# Patient Record
Sex: FEMALE | Race: WHITE | NOT HISPANIC OR LATINO
[De-identification: names, ages, dates, MRNs, and addresses within clinical notes are randomized per-mention and may not be internally consistent; named-entity substitution may affect disease eponyms.]

---

## 2017-01-03 ENCOUNTER — APPOINTMENT (OUTPATIENT)
Dept: DERMATOLOGY | Facility: CLINIC | Age: 47
End: 2017-01-03

## 2017-01-09 ENCOUNTER — APPOINTMENT (OUTPATIENT)
Dept: DERMATOLOGY | Facility: CLINIC | Age: 47
End: 2017-01-09

## 2017-01-22 ENCOUNTER — EMERGENCY (EMERGENCY)
Facility: HOSPITAL | Age: 47
LOS: 1 days | Discharge: HOME | End: 2017-01-22
Attending: EMERGENCY MEDICINE | Admitting: EMERGENCY MEDICINE
Payer: MEDICAID

## 2017-01-22 ENCOUNTER — TRANSCRIPTION ENCOUNTER (OUTPATIENT)
Age: 47
End: 2017-01-22

## 2017-01-22 VITALS
DIASTOLIC BLOOD PRESSURE: 80 MMHG | HEART RATE: 127 BPM | TEMPERATURE: 98 F | HEIGHT: 62 IN | SYSTOLIC BLOOD PRESSURE: 132 MMHG | OXYGEN SATURATION: 99 % | WEIGHT: 160.06 LBS | RESPIRATION RATE: 18 BRPM

## 2017-01-22 VITALS
HEART RATE: 91 BPM | RESPIRATION RATE: 16 BRPM | DIASTOLIC BLOOD PRESSURE: 75 MMHG | TEMPERATURE: 97 F | SYSTOLIC BLOOD PRESSURE: 122 MMHG | OXYGEN SATURATION: 97 %

## 2017-01-22 DIAGNOSIS — R19.7 DIARRHEA, UNSPECIFIED: ICD-10-CM

## 2017-01-22 LAB
ALBUMIN SERPL ELPH-MCNC: 4.2 G/DL — SIGNIFICANT CHANGE UP (ref 3.3–5)
ALP SERPL-CCNC: 81 U/L — SIGNIFICANT CHANGE UP (ref 40–120)
ALT FLD-CCNC: 54 U/L RC — HIGH (ref 10–45)
ANION GAP SERPL CALC-SCNC: 20 MMOL/L — HIGH (ref 5–17)
APPEARANCE UR: ABNORMAL
AST SERPL-CCNC: 44 U/L — HIGH (ref 10–40)
BACTERIA # UR AUTO: ABNORMAL /HPF
BASE EXCESS BLDV CALC-SCNC: -1.8 MMOL/L — SIGNIFICANT CHANGE UP (ref -2–2)
BASOPHILS # BLD AUTO: 0 K/UL — SIGNIFICANT CHANGE UP (ref 0–0.2)
BASOPHILS NFR BLD AUTO: 0.3 % — SIGNIFICANT CHANGE UP (ref 0–2)
BILIRUB SERPL-MCNC: 1.2 MG/DL — SIGNIFICANT CHANGE UP (ref 0.2–1.2)
BILIRUB UR-MCNC: NEGATIVE — SIGNIFICANT CHANGE UP
BUN SERPL-MCNC: 13 MG/DL — SIGNIFICANT CHANGE UP (ref 7–23)
CA-I SERPL-SCNC: 1.11 MMOL/L — LOW (ref 1.12–1.3)
CALCIUM SERPL-MCNC: 9.3 MG/DL — SIGNIFICANT CHANGE UP (ref 8.4–10.5)
CHLORIDE BLDV-SCNC: 109 MMOL/L — HIGH (ref 96–108)
CHLORIDE SERPL-SCNC: 99 MMOL/L — SIGNIFICANT CHANGE UP (ref 96–108)
CO2 BLDV-SCNC: 22 MMOL/L — SIGNIFICANT CHANGE UP (ref 22–30)
CO2 SERPL-SCNC: 18 MMOL/L — LOW (ref 22–31)
COLOR SPEC: YELLOW — SIGNIFICANT CHANGE UP
CREAT SERPL-MCNC: 0.71 MG/DL — SIGNIFICANT CHANGE UP (ref 0.5–1.3)
DIFF PNL FLD: ABNORMAL
EOSINOPHIL # BLD AUTO: 0 K/UL — SIGNIFICANT CHANGE UP (ref 0–0.5)
EOSINOPHIL NFR BLD AUTO: 0.5 % — SIGNIFICANT CHANGE UP (ref 0–6)
EPI CELLS # UR: SIGNIFICANT CHANGE UP /HPF
GAS PNL BLDV: 137 MMOL/L — SIGNIFICANT CHANGE UP (ref 136–145)
GAS PNL BLDV: SIGNIFICANT CHANGE UP
GLUCOSE BLDV-MCNC: 127 MG/DL — HIGH (ref 70–99)
GLUCOSE SERPL-MCNC: 126 MG/DL — HIGH (ref 70–99)
GLUCOSE UR QL: NEGATIVE — SIGNIFICANT CHANGE UP
HCO3 BLDV-SCNC: 21 MMOL/L — SIGNIFICANT CHANGE UP (ref 21–29)
HCT VFR BLD CALC: 45.9 % — HIGH (ref 34.5–45)
HCT VFR BLDA CALC: 50 % — SIGNIFICANT CHANGE UP (ref 39–50)
HGB BLD CALC-MCNC: 16.3 G/DL — HIGH (ref 11.5–15.5)
HGB BLD-MCNC: 16.4 G/DL — HIGH (ref 11.5–15.5)
KETONES UR-MCNC: ABNORMAL
LACTATE BLDV-MCNC: 2 MMOL/L — SIGNIFICANT CHANGE UP (ref 0.7–2)
LEUKOCYTE ESTERASE UR-ACNC: ABNORMAL
LIDOCAIN IGE QN: 29 U/L — SIGNIFICANT CHANGE UP (ref 7–60)
LYMPHOCYTES # BLD AUTO: 1.1 K/UL — SIGNIFICANT CHANGE UP (ref 1–3.3)
LYMPHOCYTES # BLD AUTO: 13.6 % — SIGNIFICANT CHANGE UP (ref 13–44)
MCHC RBC-ENTMCNC: 29.8 PG — SIGNIFICANT CHANGE UP (ref 27–34)
MCHC RBC-ENTMCNC: 35.7 GM/DL — SIGNIFICANT CHANGE UP (ref 32–36)
MCV RBC AUTO: 83.6 FL — SIGNIFICANT CHANGE UP (ref 80–100)
MONOCYTES # BLD AUTO: 0.6 K/UL — SIGNIFICANT CHANGE UP (ref 0–0.9)
MONOCYTES NFR BLD AUTO: 8.1 % — SIGNIFICANT CHANGE UP (ref 2–14)
NEUTROPHILS # BLD AUTO: 6.1 K/UL — SIGNIFICANT CHANGE UP (ref 1.8–7.4)
NEUTROPHILS NFR BLD AUTO: 77.5 % — HIGH (ref 43–77)
NITRITE UR-MCNC: NEGATIVE — SIGNIFICANT CHANGE UP
PCO2 BLDV: 33 MMHG — LOW (ref 35–50)
PH BLDV: 7.42 — SIGNIFICANT CHANGE UP (ref 7.35–7.45)
PH UR: 6 — SIGNIFICANT CHANGE UP (ref 4.8–8)
PLATELET # BLD AUTO: 198 K/UL — SIGNIFICANT CHANGE UP (ref 150–400)
PO2 BLDV: 27 MMHG — SIGNIFICANT CHANGE UP (ref 25–45)
POTASSIUM BLDV-SCNC: 3.3 MMOL/L — LOW (ref 3.5–5)
POTASSIUM SERPL-MCNC: 4 MMOL/L — SIGNIFICANT CHANGE UP (ref 3.5–5.3)
POTASSIUM SERPL-SCNC: 4 MMOL/L — SIGNIFICANT CHANGE UP (ref 3.5–5.3)
PROT SERPL-MCNC: 7.6 G/DL — SIGNIFICANT CHANGE UP (ref 6–8.3)
PROT UR-MCNC: 30 MG/DL
RBC # BLD: 5.49 M/UL — HIGH (ref 3.8–5.2)
RBC # FLD: 11.6 % — SIGNIFICANT CHANGE UP (ref 10.3–14.5)
RBC CASTS # UR COMP ASSIST: ABNORMAL /HPF (ref 0–2)
SAO2 % BLDV: 47 % — LOW (ref 67–88)
SODIUM SERPL-SCNC: 137 MMOL/L — SIGNIFICANT CHANGE UP (ref 135–145)
SP GR SPEC: 1.02 — SIGNIFICANT CHANGE UP (ref 1.01–1.02)
UROBILINOGEN FLD QL: NEGATIVE — SIGNIFICANT CHANGE UP
WBC # BLD: 7.9 K/UL — SIGNIFICANT CHANGE UP (ref 3.8–10.5)
WBC # FLD AUTO: 7.9 K/UL — SIGNIFICANT CHANGE UP (ref 3.8–10.5)
WBC UR QL: SIGNIFICANT CHANGE UP /HPF (ref 0–5)

## 2017-01-22 PROCEDURE — 83690 ASSAY OF LIPASE: CPT

## 2017-01-22 PROCEDURE — 82435 ASSAY OF BLOOD CHLORIDE: CPT

## 2017-01-22 PROCEDURE — 81001 URINALYSIS AUTO W/SCOPE: CPT

## 2017-01-22 PROCEDURE — 85027 COMPLETE CBC AUTOMATED: CPT

## 2017-01-22 PROCEDURE — 76705 ECHO EXAM OF ABDOMEN: CPT

## 2017-01-22 PROCEDURE — 80053 COMPREHEN METABOLIC PANEL: CPT

## 2017-01-22 PROCEDURE — 83605 ASSAY OF LACTIC ACID: CPT

## 2017-01-22 PROCEDURE — 82803 BLOOD GASES ANY COMBINATION: CPT

## 2017-01-22 PROCEDURE — 82947 ASSAY GLUCOSE BLOOD QUANT: CPT

## 2017-01-22 PROCEDURE — 99284 EMERGENCY DEPT VISIT MOD MDM: CPT | Mod: 25

## 2017-01-22 PROCEDURE — 96374 THER/PROPH/DIAG INJ IV PUSH: CPT

## 2017-01-22 PROCEDURE — 85014 HEMATOCRIT: CPT

## 2017-01-22 PROCEDURE — 84132 ASSAY OF SERUM POTASSIUM: CPT

## 2017-01-22 PROCEDURE — 82330 ASSAY OF CALCIUM: CPT

## 2017-01-22 PROCEDURE — 93005 ELECTROCARDIOGRAM TRACING: CPT

## 2017-01-22 PROCEDURE — 96375 TX/PRO/DX INJ NEW DRUG ADDON: CPT

## 2017-01-22 PROCEDURE — 93010 ELECTROCARDIOGRAM REPORT: CPT

## 2017-01-22 PROCEDURE — 84295 ASSAY OF SERUM SODIUM: CPT

## 2017-01-22 PROCEDURE — 99285 EMERGENCY DEPT VISIT HI MDM: CPT | Mod: 25

## 2017-01-22 RX ORDER — SODIUM CHLORIDE 9 MG/ML
1000 INJECTION INTRAMUSCULAR; INTRAVENOUS; SUBCUTANEOUS ONCE
Qty: 0 | Refills: 0 | Status: COMPLETED | OUTPATIENT
Start: 2017-01-22 | End: 2017-01-22

## 2017-01-22 RX ORDER — ONDANSETRON 8 MG/1
1 TABLET, FILM COATED ORAL
Qty: 9 | Refills: 0 | OUTPATIENT
Start: 2017-01-22 | End: 2017-01-25

## 2017-01-22 RX ORDER — GABAPENTIN 400 MG/1
1 CAPSULE ORAL
Qty: 0 | Refills: 0 | COMMUNITY

## 2017-01-22 RX ORDER — ONDANSETRON 8 MG/1
4 TABLET, FILM COATED ORAL ONCE
Qty: 0 | Refills: 0 | Status: COMPLETED | OUTPATIENT
Start: 2017-01-22 | End: 2017-01-22

## 2017-01-22 RX ORDER — KETOROLAC TROMETHAMINE 30 MG/ML
15 SYRINGE (ML) INJECTION ONCE
Qty: 0 | Refills: 0 | Status: DISCONTINUED | OUTPATIENT
Start: 2017-01-22 | End: 2017-01-22

## 2017-01-22 RX ADMIN — Medication 15 MILLIGRAM(S): at 16:41

## 2017-01-22 RX ADMIN — ONDANSETRON 4 MILLIGRAM(S): 8 TABLET, FILM COATED ORAL at 16:41

## 2017-01-22 RX ADMIN — SODIUM CHLORIDE 4000 MILLILITER(S): 9 INJECTION INTRAMUSCULAR; INTRAVENOUS; SUBCUTANEOUS at 16:42

## 2017-01-22 RX ADMIN — SODIUM CHLORIDE 4000 MILLILITER(S): 9 INJECTION INTRAMUSCULAR; INTRAVENOUS; SUBCUTANEOUS at 16:41

## 2017-01-22 NOTE — ED ADULT NURSE NOTE - OBJECTIVE STATEMENT
47 y/o F pt, present to ED with nausea, vomiting, diarrhea and abd pain since yesterday, started with nausea and vomiting, abd pain is epigastric that's radiates RUQ, chills on and off, no fevers, pt went to urgent care who sent her to the ED, pt unable to tolerate PO intake due to N/V, on exam pt abd soft, ND, tenderness to palpate to RUQ, nauseous, denies chest pain, SOB, dizziness, dysuria, hematuria, melena

## 2017-01-22 NOTE — ED PROVIDER NOTE - MEDICAL DECISION MAKING DETAILS
Resident: DENISE,. chloe epigastric tenderness will check ultrasound RUQ and lipase. will po challenge aftert zofran

## 2017-01-22 NOTE — ED PROVIDER NOTE - OBJECTIVE STATEMENT
45 y/o F with 1 day history of abd pain, nausea/vomiting/diarrhea.  6 x vomiting, multiple diarrhea, nonbloody non bilious.  abd pain is generalized moderate crampy and nonradiating. No fever + chills, no urinary complaints. 45 y/o F with 1 day history of abd pain, nausea/vomiting/diarrhea.  6 x vomiting, multiple diarrhea, nonbloody non bilious.  abd pain is generalized moderate crampy and non radiating. No fever + chills, no urinary complaints.

## 2017-01-22 NOTE — ED PROCEDURE NOTE - PROCEDURE ADDITIONAL DETAILS
Emergency Department Focused Ultrasound performed at patient's bedside.  The complete report can be found in PACS.

## 2017-01-22 NOTE — ED PROVIDER NOTE - PROGRESS NOTE DETAILS
Patient pain improved. Still having diarrhea. no vomiting. tolerated po. ruq u/s wnl. will reassess feels better. states no s/s uti - will not treat, told to f/u pmd samantha if urinary symptoms. -sukumar

## 2017-01-23 PROCEDURE — 76705 ECHO EXAM OF ABDOMEN: CPT | Mod: 26

## 2017-01-24 ENCOUNTER — APPOINTMENT (OUTPATIENT)
Dept: DERMATOLOGY | Facility: CLINIC | Age: 47
End: 2017-01-24

## 2017-01-30 PROBLEM — M79.7 FIBROMYALGIA: Chronic | Status: ACTIVE | Noted: 2017-01-22

## 2017-02-06 ENCOUNTER — APPOINTMENT (OUTPATIENT)
Dept: INTERNAL MEDICINE | Facility: CLINIC | Age: 47
End: 2017-02-06

## 2017-02-06 VITALS
SYSTOLIC BLOOD PRESSURE: 120 MMHG | DIASTOLIC BLOOD PRESSURE: 80 MMHG | TEMPERATURE: 98.4 F | WEIGHT: 160 LBS | HEIGHT: 62 IN | BODY MASS INDEX: 29.44 KG/M2

## 2017-02-16 ENCOUNTER — FORM ENCOUNTER (OUTPATIENT)
Age: 47
End: 2017-02-16

## 2017-02-17 ENCOUNTER — APPOINTMENT (OUTPATIENT)
Dept: ULTRASOUND IMAGING | Facility: CLINIC | Age: 47
End: 2017-02-17

## 2017-02-17 ENCOUNTER — OUTPATIENT (OUTPATIENT)
Dept: OUTPATIENT SERVICES | Facility: HOSPITAL | Age: 47
LOS: 1 days | End: 2017-02-17
Payer: MEDICAID

## 2017-02-17 DIAGNOSIS — R79.89 OTHER SPECIFIED ABNORMAL FINDINGS OF BLOOD CHEMISTRY: ICD-10-CM

## 2017-04-13 PROCEDURE — 76700 US EXAM ABDOM COMPLETE: CPT

## 2017-05-10 ENCOUNTER — APPOINTMENT (OUTPATIENT)
Dept: DERMATOLOGY | Facility: CLINIC | Age: 47
End: 2017-05-10

## 2017-05-10 VITALS — DIASTOLIC BLOOD PRESSURE: 72 MMHG | SYSTOLIC BLOOD PRESSURE: 122 MMHG

## 2017-05-10 VITALS — SYSTOLIC BLOOD PRESSURE: 122 MMHG | DIASTOLIC BLOOD PRESSURE: 72 MMHG

## 2017-06-07 ENCOUNTER — EMERGENCY (EMERGENCY)
Facility: HOSPITAL | Age: 47
LOS: 1 days | Discharge: HOME | End: 2017-06-07
Attending: EMERGENCY MEDICINE | Admitting: EMERGENCY MEDICINE
Payer: COMMERCIAL

## 2017-06-07 VITALS
DIASTOLIC BLOOD PRESSURE: 68 MMHG | HEART RATE: 83 BPM | OXYGEN SATURATION: 100 % | RESPIRATION RATE: 18 BRPM | TEMPERATURE: 97 F | SYSTOLIC BLOOD PRESSURE: 137 MMHG

## 2017-06-07 DIAGNOSIS — Z90.710 ACQUIRED ABSENCE OF BOTH CERVIX AND UTERUS: Chronic | ICD-10-CM

## 2017-06-07 PROCEDURE — 73080 X-RAY EXAM OF ELBOW: CPT | Mod: 26,LT

## 2017-06-07 PROCEDURE — 72125 CT NECK SPINE W/O DYE: CPT | Mod: 26

## 2017-06-07 PROCEDURE — 73502 X-RAY EXAM HIP UNI 2-3 VIEWS: CPT | Mod: 26,LT

## 2017-06-07 PROCEDURE — 99284 EMERGENCY DEPT VISIT MOD MDM: CPT

## 2017-06-07 RX ORDER — IBUPROFEN 200 MG
600 TABLET ORAL ONCE
Qty: 0 | Refills: 0 | Status: COMPLETED | OUTPATIENT
Start: 2017-06-07 | End: 2017-06-07

## 2017-06-07 RX ADMIN — Medication 600 MILLIGRAM(S): at 18:29

## 2017-06-07 NOTE — ED PROVIDER NOTE - PROGRESS NOTE DETAILS
EVA POLLOCK: Received signout and discussed plan with QUID attending. CT and Xrays negative, pain well controlled. Stable for d/c home with followup. Pt amenable with plan.

## 2017-06-07 NOTE — ED PROVIDER NOTE - NS CPE EDP [facility] CERVICAL LOC
tenderness/Non focal diffuse cervical tenderness. No midline cervical tenderness. No cervical stepoffs.
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
## 2017-06-07 NOTE — ED PROVIDER NOTE - NS ED MD SCRIBE ATTENDING SCRIBE SECTIONS
REVIEW OF SYSTEMS/HIV/PAST MEDICAL/SURGICAL/SOCIAL HISTORY/VITAL SIGNS( Pullset)/HISTORY OF PRESENT ILLNESS/DISPOSITION/PHYSICAL EXAM

## 2017-06-07 NOTE — ED PROVIDER NOTE - NEUROLOGICAL, MLM
Alert and oriented, no focal deficits, no motor or sensory deficits.  5/5 motor strength in all four extremities. Limited in left lower extremity secondary to pain.

## 2017-06-07 NOTE — ED PROVIDER NOTE - PLAN OF CARE
Rest, apply heat to affected area.  Take Motrin 600mg every 8 hrs with food for pain.  Follow up with PMD within 48-72 hrs.  Any worsening pain, swelling, weakness, numbness return to ED.

## 2017-06-07 NOTE — ED PROVIDER NOTE - ATTENDING CONTRIBUTION TO CARE
I performed the initial face to face bedside interview with this patient regarding history of present illness, review of symptoms and past medical, social and family history.  I completed an independent physical examination.  I was the initial provider who evaluated this patient.  The history, review of symptoms and examination was documented by the scribe in my presence and I attest to the accuracy of the documentation.  I have signed out the follow up of any pending tests (i.e. labs, radiological studies) to the PA.  I have discussed the patient’s plan of care and disposition with the PA.  -LORA Pineda, DO

## 2017-06-27 ENCOUNTER — APPOINTMENT (OUTPATIENT)
Dept: INTERNAL MEDICINE | Facility: CLINIC | Age: 47
End: 2017-06-27

## 2017-07-28 ENCOUNTER — APPOINTMENT (OUTPATIENT)
Dept: DERMATOLOGY | Facility: CLINIC | Age: 47
End: 2017-07-28

## 2017-10-17 ENCOUNTER — APPOINTMENT (OUTPATIENT)
Dept: OPHTHALMOLOGY | Facility: CLINIC | Age: 47
End: 2017-10-17
Payer: MEDICAID

## 2017-10-17 PROCEDURE — 92015 DETERMINE REFRACTIVE STATE: CPT

## 2017-10-17 PROCEDURE — 92014 COMPRE OPH EXAM EST PT 1/>: CPT

## 2017-10-17 PROCEDURE — 92250 FUNDUS PHOTOGRAPHY W/I&R: CPT

## 2017-12-15 ENCOUNTER — APPOINTMENT (OUTPATIENT)
Dept: DERMATOLOGY | Facility: CLINIC | Age: 47
End: 2017-12-15
Payer: MEDICAID

## 2017-12-15 VITALS — DIASTOLIC BLOOD PRESSURE: 72 MMHG | SYSTOLIC BLOOD PRESSURE: 110 MMHG

## 2017-12-15 PROCEDURE — 17110 DESTRUCTION B9 LES UP TO 14: CPT

## 2017-12-15 PROCEDURE — 99211 OFF/OP EST MAY X REQ PHY/QHP: CPT | Mod: 25

## 2017-12-26 ENCOUNTER — MED ADMIN CHARGE (OUTPATIENT)
Age: 47
End: 2017-12-26

## 2017-12-26 ENCOUNTER — APPOINTMENT (OUTPATIENT)
Dept: INTERNAL MEDICINE | Facility: CLINIC | Age: 47
End: 2017-12-26
Payer: MEDICAID

## 2017-12-26 PROCEDURE — 90688 IIV4 VACCINE SPLT 0.5 ML IM: CPT

## 2017-12-26 PROCEDURE — G0008: CPT

## 2018-03-30 ENCOUNTER — APPOINTMENT (OUTPATIENT)
Dept: DERMATOLOGY | Facility: CLINIC | Age: 48
End: 2018-03-30
Payer: MEDICAID

## 2018-03-30 VITALS
SYSTOLIC BLOOD PRESSURE: 110 MMHG | WEIGHT: 160 LBS | BODY MASS INDEX: 29.44 KG/M2 | DIASTOLIC BLOOD PRESSURE: 70 MMHG | HEIGHT: 62 IN

## 2018-03-30 DIAGNOSIS — L71.9 ROSACEA, UNSPECIFIED: ICD-10-CM

## 2018-03-30 PROCEDURE — 99213 OFFICE O/P EST LOW 20 MIN: CPT

## 2018-03-30 RX ORDER — METRONIDAZOLE 7.5 MG/G
0.75 CREAM TOPICAL
Qty: 60 | Refills: 3 | Status: ACTIVE | COMMUNITY
Start: 2018-03-30 | End: 1900-01-01

## 2018-04-12 NOTE — ED PROVIDER NOTE - SKIN, MLM
HPI:   Kerline Alba is a 60year old female who presents for upper respiratory symptoms for  7  days. Patient reports congestion, cough with green colored sputum, sinus pain.       Current Outpatient Prescriptions:  Ciprofloxacin HCl (CIPRO) 500 MG Oral T endometriosis (Ovaries left intact)  1996: LEG/ANKLE SURGERY PROC UNLISTED      Comment: right ankle  No date: OTHER      Comment: ARTHROSCOPIC KNEE SURGERY BILAT  No date: OTHER      Comment: \"BRACHIAL CEFT (LEFT?)CYST\"  2012: OTHER SURGICAL HISTORY Skin normal color for race, warm, dry and intact. No evidence of rash.

## 2018-05-01 ENCOUNTER — LABORATORY RESULT (OUTPATIENT)
Age: 48
End: 2018-05-01

## 2018-05-01 ENCOUNTER — APPOINTMENT (OUTPATIENT)
Dept: INTERNAL MEDICINE | Facility: CLINIC | Age: 48
End: 2018-05-01
Payer: MEDICAID

## 2018-05-01 PROCEDURE — 36415 COLL VENOUS BLD VENIPUNCTURE: CPT

## 2018-05-08 ENCOUNTER — APPOINTMENT (OUTPATIENT)
Dept: INTERNAL MEDICINE | Facility: CLINIC | Age: 48
End: 2018-05-08
Payer: MEDICAID

## 2018-05-08 ENCOUNTER — NON-APPOINTMENT (OUTPATIENT)
Age: 48
End: 2018-05-08

## 2018-05-08 VITALS
WEIGHT: 165 LBS | TEMPERATURE: 98.5 F | SYSTOLIC BLOOD PRESSURE: 110 MMHG | BODY MASS INDEX: 30.36 KG/M2 | HEIGHT: 62 IN | DIASTOLIC BLOOD PRESSURE: 84 MMHG

## 2018-05-08 VITALS — DIASTOLIC BLOOD PRESSURE: 80 MMHG | SYSTOLIC BLOOD PRESSURE: 120 MMHG

## 2018-05-08 PROCEDURE — 99396 PREV VISIT EST AGE 40-64: CPT | Mod: 25

## 2018-05-08 PROCEDURE — 93000 ELECTROCARDIOGRAM COMPLETE: CPT

## 2018-05-08 PROCEDURE — 94010 BREATHING CAPACITY TEST: CPT

## 2018-05-08 PROCEDURE — 83036 HEMOGLOBIN GLYCOSYLATED A1C: CPT | Mod: QW

## 2018-05-08 RX ORDER — OXYMETAZOLINE HYDROCHLORIDE 1 G/100G
1 CREAM TOPICAL
Qty: 60 | Refills: 1 | Status: DISCONTINUED | COMMUNITY
Start: 2017-12-15 | End: 2018-05-08

## 2018-05-08 RX ORDER — NYSTATIN 100000 1/G
100000 POWDER TOPICAL
Qty: 2 | Refills: 3 | Status: DISCONTINUED | COMMUNITY
Start: 2018-03-30 | End: 2018-05-08

## 2018-05-08 RX ORDER — DICLOFENAC SODIUM 75 MG/1
75 TABLET, DELAYED RELEASE ORAL
Qty: 60 | Refills: 0 | Status: DISCONTINUED | COMMUNITY
Start: 2018-01-29 | End: 2018-05-08

## 2018-05-09 LAB
APPEARANCE: CLEAR
BACTERIA: ABNORMAL
BILIRUBIN URINE: NEGATIVE
BLOOD URINE: NEGATIVE
COLOR: YELLOW
CREAT SPEC-SCNC: 128 MG/DL
GLUCOSE QUALITATIVE U: NEGATIVE MG/DL
HYALINE CASTS: 2 /LPF
KETONES URINE: NEGATIVE
LEUKOCYTE ESTERASE URINE: NEGATIVE
MICROALBUMIN 24H UR DL<=1MG/L-MCNC: 0.4 MG/DL
MICROALBUMIN/CREAT 24H UR-RTO: 3 MG/G
MICROSCOPIC-UA: NORMAL
NITRITE URINE: NEGATIVE
PH URINE: 5.5
PROTEIN URINE: NEGATIVE MG/DL
RED BLOOD CELLS URINE: 2 /HPF
SPECIFIC GRAVITY URINE: 1.03
SQUAMOUS EPITHELIAL CELLS: 5 /HPF
UROBILINOGEN URINE: NEGATIVE MG/DL
WHITE BLOOD CELLS URINE: 3 /HPF

## 2018-05-10 LAB — HBA1C MFR BLD HPLC: 6.1

## 2018-06-07 ENCOUNTER — APPOINTMENT (OUTPATIENT)
Dept: OTOLARYNGOLOGY | Facility: CLINIC | Age: 48
End: 2018-06-07
Payer: MEDICAID

## 2018-06-07 VITALS
WEIGHT: 160 LBS | HEIGHT: 62 IN | HEART RATE: 80 BPM | BODY MASS INDEX: 29.44 KG/M2 | DIASTOLIC BLOOD PRESSURE: 78 MMHG | SYSTOLIC BLOOD PRESSURE: 114 MMHG

## 2018-06-07 DIAGNOSIS — J34.3 HYPERTROPHY OF NASAL TURBINATES: ICD-10-CM

## 2018-06-07 PROCEDURE — 99203 OFFICE O/P NEW LOW 30 MIN: CPT | Mod: 25

## 2018-06-07 PROCEDURE — 31231 NASAL ENDOSCOPY DX: CPT

## 2018-06-07 RX ORDER — EPINEPHRINE 0.3 MG/.3ML
0.3 INJECTION INTRAMUSCULAR
Refills: 0 | Status: ACTIVE | COMMUNITY

## 2018-07-04 ENCOUNTER — TRANSCRIPTION ENCOUNTER (OUTPATIENT)
Age: 48
End: 2018-07-04

## 2018-07-06 ENCOUNTER — APPOINTMENT (OUTPATIENT)
Dept: DERMATOLOGY | Facility: CLINIC | Age: 48
End: 2018-07-06
Payer: MEDICAID

## 2018-07-06 VITALS — DIASTOLIC BLOOD PRESSURE: 84 MMHG | SYSTOLIC BLOOD PRESSURE: 120 MMHG

## 2018-07-06 PROCEDURE — 99213 OFFICE O/P EST LOW 20 MIN: CPT

## 2018-07-26 ENCOUNTER — APPOINTMENT (OUTPATIENT)
Dept: INTERNAL MEDICINE | Facility: CLINIC | Age: 48
End: 2018-07-26
Payer: MEDICAID

## 2018-07-26 VITALS
BODY MASS INDEX: 29.44 KG/M2 | SYSTOLIC BLOOD PRESSURE: 112 MMHG | HEIGHT: 62 IN | TEMPERATURE: 97.9 F | WEIGHT: 160 LBS | DIASTOLIC BLOOD PRESSURE: 70 MMHG

## 2018-07-26 DIAGNOSIS — R94.5 ABNORMAL RESULTS OF LIVER FUNCTION STUDIES: ICD-10-CM

## 2018-07-26 DIAGNOSIS — Z01.818 ENCOUNTER FOR OTHER PREPROCEDURAL EXAMINATION: ICD-10-CM

## 2018-07-26 DIAGNOSIS — M47.817 SPONDYLOSIS W/OUT MYELOPATHY OR RADICULOPATHY, LUMBOSACRAL REGION: ICD-10-CM

## 2018-07-26 DIAGNOSIS — Z87.42 PERSONAL HISTORY OF OTHER DISEASES OF THE FEMALE GENITAL TRACT: ICD-10-CM

## 2018-07-26 DIAGNOSIS — R10.32 LEFT LOWER QUADRANT PAIN: ICD-10-CM

## 2018-07-26 DIAGNOSIS — J18.9 PNEUMONIA, UNSPECIFIED ORGANISM: ICD-10-CM

## 2018-07-26 DIAGNOSIS — Z87.2 PERSONAL HISTORY OF DISEASES OF THE SKIN AND SUBCUTANEOUS TISSUE: ICD-10-CM

## 2018-07-26 DIAGNOSIS — Z86.79 PERSONAL HISTORY OF OTHER DISEASES OF THE CIRCULATORY SYSTEM: ICD-10-CM

## 2018-07-26 DIAGNOSIS — Z87.19 PERSONAL HISTORY OF OTHER DISEASES OF THE DIGESTIVE SYSTEM: ICD-10-CM

## 2018-07-26 DIAGNOSIS — Z87.898 PERSONAL HISTORY OF OTHER SPECIFIED CONDITIONS: ICD-10-CM

## 2018-07-26 DIAGNOSIS — J06.9 ACUTE UPPER RESPIRATORY INFECTION, UNSPECIFIED: ICD-10-CM

## 2018-07-26 DIAGNOSIS — J34.89 OTHER SPECIFIED DISORDERS OF NOSE AND NASAL SINUSES: ICD-10-CM

## 2018-07-26 DIAGNOSIS — M25.569 PAIN IN UNSPECIFIED KNEE: ICD-10-CM

## 2018-07-26 DIAGNOSIS — Z87.09 PERSONAL HISTORY OF OTHER DISEASES OF THE RESPIRATORY SYSTEM: ICD-10-CM

## 2018-07-26 DIAGNOSIS — E66.3 OVERWEIGHT: ICD-10-CM

## 2018-07-26 DIAGNOSIS — H44.23 DEGENERATIVE MYOPIA, BILATERAL: ICD-10-CM

## 2018-07-26 DIAGNOSIS — Z86.018 PERSONAL HISTORY OF OTHER BENIGN NEOPLASM: ICD-10-CM

## 2018-07-26 DIAGNOSIS — M25.552 PAIN IN LEFT HIP: ICD-10-CM

## 2018-07-26 DIAGNOSIS — L30.4 ERYTHEMA INTERTRIGO: ICD-10-CM

## 2018-07-26 DIAGNOSIS — Z92.29 PERSONAL HISTORY OF OTHER DRUG THERAPY: ICD-10-CM

## 2018-07-26 DIAGNOSIS — M67.441 GANGLION, RIGHT HAND: ICD-10-CM

## 2018-07-26 DIAGNOSIS — Z87.39 PERSONAL HISTORY OF OTHER DISEASES OF THE MUSCULOSKELETAL SYSTEM AND CONNECTIVE TISSUE: ICD-10-CM

## 2018-07-26 DIAGNOSIS — Z86.19 PERSONAL HISTORY OF OTHER INFECTIOUS AND PARASITIC DISEASES: ICD-10-CM

## 2018-07-26 PROCEDURE — 99214 OFFICE O/P EST MOD 30 MIN: CPT

## 2018-07-26 RX ORDER — HYDROCORTISONE 25 MG/G
2.5 CREAM TOPICAL
Qty: 1 | Refills: 0 | Status: DISCONTINUED | COMMUNITY
Start: 2018-07-06 | End: 2018-07-26

## 2018-08-20 ENCOUNTER — APPOINTMENT (OUTPATIENT)
Dept: OTOLARYNGOLOGY | Facility: CLINIC | Age: 48
End: 2018-08-20

## 2018-09-18 RX ORDER — KETOCONAZOLE 20 MG/G
2 CREAM TOPICAL
Qty: 1 | Refills: 1 | Status: ACTIVE | COMMUNITY
Start: 2018-03-30 | End: 1900-01-01

## 2018-10-05 ENCOUNTER — APPOINTMENT (OUTPATIENT)
Dept: GASTROENTEROLOGY | Facility: CLINIC | Age: 48
End: 2018-10-05
Payer: MEDICAID

## 2018-10-05 VITALS
BODY MASS INDEX: 29.44 KG/M2 | WEIGHT: 160 LBS | HEIGHT: 62 IN | DIASTOLIC BLOOD PRESSURE: 80 MMHG | SYSTOLIC BLOOD PRESSURE: 110 MMHG | TEMPERATURE: 98.3 F

## 2018-10-05 PROCEDURE — 99214 OFFICE O/P EST MOD 30 MIN: CPT

## 2018-10-05 RX ORDER — PANTOPRAZOLE 40 MG/1
40 TABLET, DELAYED RELEASE ORAL
Qty: 30 | Refills: 5 | Status: ACTIVE | COMMUNITY
Start: 2018-10-05 | End: 1900-01-01

## 2018-10-10 ENCOUNTER — TRANSCRIPTION ENCOUNTER (OUTPATIENT)
Age: 48
End: 2018-10-10

## 2018-10-13 ENCOUNTER — TRANSCRIPTION ENCOUNTER (OUTPATIENT)
Age: 48
End: 2018-10-13

## 2018-10-17 ENCOUNTER — APPOINTMENT (OUTPATIENT)
Dept: INTERNAL MEDICINE | Facility: CLINIC | Age: 48
End: 2018-10-17
Payer: MEDICAID

## 2018-10-17 VITALS
WEIGHT: 160 LBS | DIASTOLIC BLOOD PRESSURE: 80 MMHG | SYSTOLIC BLOOD PRESSURE: 118 MMHG | HEIGHT: 62 IN | BODY MASS INDEX: 29.44 KG/M2 | TEMPERATURE: 98.9 F

## 2018-10-17 DIAGNOSIS — R06.2 WHEEZING: ICD-10-CM

## 2018-10-17 DIAGNOSIS — B97.89 ACUTE UPPER RESPIRATORY INFECTION, UNSPECIFIED: ICD-10-CM

## 2018-10-17 DIAGNOSIS — J06.9 ACUTE UPPER RESPIRATORY INFECTION, UNSPECIFIED: ICD-10-CM

## 2018-10-17 PROCEDURE — 99213 OFFICE O/P EST LOW 20 MIN: CPT | Mod: 25

## 2018-10-17 PROCEDURE — 94010 BREATHING CAPACITY TEST: CPT

## 2018-10-17 NOTE — PHYSICAL EXAM
[No Acute Distress] : no acute distress [Well Nourished] : well nourished [Well Developed] : well developed [No Respiratory Distress] : no respiratory distress  [No Accessory Muscle Use] : no accessory muscle use [Alert and Oriented x3] : oriented to person, place, and time [Normal Mood] : the mood was normal [de-identified] : wheezing

## 2018-10-17 NOTE — HISTORY OF PRESENT ILLNESS
[Congestion] : congestion [Cough] : cough [Cold Symptoms] : cold symptoms [Moderate] : moderate [___ Days ago] : [unfilled] days ago [Sudden] : suddenly [Constant] : constant [Shortness Of Breath] : shortness of breath [Fatigue] : fatigue [Worsening] : worsening [Wheezing] : no wheezing [Chills] : no chills [Anorexia] : no anorexia [Earache] : no earache [Headache] : no headache [Fever] : no fever

## 2018-10-19 LAB
FLU A RESULT: NOT DETECTED
FLU B RESULT: NOT DETECTED
RSV RESULT: NOT DETECTED

## 2018-11-06 RX ORDER — HYDROCORTISONE 25 MG/G
2.5 CREAM TOPICAL
Qty: 1 | Refills: 2 | Status: ACTIVE | COMMUNITY
Start: 2018-11-06 | End: 1900-01-01

## 2018-12-03 ENCOUNTER — APPOINTMENT (OUTPATIENT)
Dept: INTERNAL MEDICINE | Facility: CLINIC | Age: 48
End: 2018-12-03
Payer: MEDICAID

## 2018-12-03 ENCOUNTER — NON-APPOINTMENT (OUTPATIENT)
Age: 48
End: 2018-12-03

## 2018-12-03 ENCOUNTER — RESULT CHARGE (OUTPATIENT)
Age: 48
End: 2018-12-03

## 2018-12-03 VITALS
BODY MASS INDEX: 30.36 KG/M2 | WEIGHT: 166 LBS | SYSTOLIC BLOOD PRESSURE: 112 MMHG | TEMPERATURE: 98.5 F | DIASTOLIC BLOOD PRESSURE: 90 MMHG

## 2018-12-03 VITALS — SYSTOLIC BLOOD PRESSURE: 100 MMHG | DIASTOLIC BLOOD PRESSURE: 80 MMHG

## 2018-12-03 DIAGNOSIS — R73.01 IMPAIRED FASTING GLUCOSE: ICD-10-CM

## 2018-12-03 DIAGNOSIS — R05 COUGH: ICD-10-CM

## 2018-12-03 PROCEDURE — 93000 ELECTROCARDIOGRAM COMPLETE: CPT

## 2018-12-03 PROCEDURE — 99214 OFFICE O/P EST MOD 30 MIN: CPT | Mod: 25

## 2018-12-03 PROCEDURE — G0008: CPT

## 2018-12-03 PROCEDURE — 90674 CCIIV4 VAC NO PRSV 0.5 ML IM: CPT

## 2018-12-03 RX ORDER — PREDNISONE 10 MG/1
10 TABLET ORAL
Qty: 15 | Refills: 0 | Status: DISCONTINUED | COMMUNITY
Start: 2018-10-17 | End: 2018-12-03

## 2018-12-03 NOTE — REASON FOR VISIT
[Follow-Up - Clinic] : a clinic follow-up of [Hyperlipidemia] : hyperlipidemia [FreeTextEntry1] : \par \par \par generally well\par \par seen by ENT-----AM congestion; early Oct. had URI;  had "nagging" cough; went to ENT who\par attributed cough  to GERD.; gets GERD sx. when not eating; still coughing\par \par started atorva. for hyperlipidemia---needs bw\par \par did not seen endocrinologist for pre-diabetes.....A1C=6.1\par \par

## 2018-12-03 NOTE — PHYSICAL EXAM
[General Appearance - Well Developed] : well developed [Normal Appearance] : normal appearance [Well Groomed] : well groomed [General Appearance - Well Nourished] : well nourished [No Deformities] : no deformities [General Appearance - In No Acute Distress] : no acute distress [Normal Conjunctiva] : the conjunctiva exhibited no abnormalities [Eyelids - No Xanthelasma] : the eyelids demonstrated no xanthelasmas [Normal Oral Mucosa] : normal oral mucosa [No Oral Pallor] : no oral pallor [No Oral Cyanosis] : no oral cyanosis [Normal Jugular Venous A Waves Present] : normal jugular venous A waves present [Normal Jugular Venous V Waves Present] : normal jugular venous V waves present [No Jugular Venous Tom A Waves] : no jugular venous tom A waves [Respiration, Rhythm And Depth] : normal respiratory rhythm and effort [Exaggerated Use Of Accessory Muscles For Inspiration] : no accessory muscle use [Auscultation Breath Sounds / Voice Sounds] : lungs were clear to auscultation bilaterally [Heart Rate And Rhythm] : heart rate and rhythm were normal [Heart Sounds] : normal S1 and S2 [Murmurs] : no murmurs present [Abdomen Soft] : soft [Abdomen Tenderness] : non-tender [Abdomen Mass (___ Cm)] : no abdominal mass palpated [Abnormal Walk] : normal gait [Gait - Sufficient For Exercise Testing] : the gait was sufficient for exercise testing [Nail Clubbing] : no clubbing of the fingernails [Cyanosis, Localized] : no localized cyanosis [Petechial Hemorrhages (___cm)] : no petechial hemorrhages [] : no ischemic changes

## 2018-12-03 NOTE — ASSESSMENT
[FreeTextEntry1] : \par \par ecg---SR; WNL\par \par imp---impaired fasting glucose---A1C =6.1 last check;  endocrinologist advised but did not see\par           cough---persistent; no change on PPI\par           obesity---BMI=30; interval weight gain=6#\par           hyperlipidemia---started on statin ; needs f/u bw\par \par plan---FBW:  CMP, lipids, LDL\par            dietary counseling re: carbs and weight loss\par            pulm. consult, Dr. PHOEBE Pineda re: cough; needs cxr\par            endocrine consult---wants to see 's endocrinologist\par            flu vaccine administered\par            CPE after May 8, 2019

## 2018-12-07 ENCOUNTER — APPOINTMENT (OUTPATIENT)
Dept: INTERNAL MEDICINE | Facility: CLINIC | Age: 48
End: 2018-12-07
Payer: MEDICAID

## 2018-12-07 PROCEDURE — 36415 COLL VENOUS BLD VENIPUNCTURE: CPT

## 2018-12-11 LAB
ALBUMIN SERPL ELPH-MCNC: 4.5 G/DL
ALP BLD-CCNC: 84 U/L
ALT SERPL-CCNC: 61 U/L
ANION GAP SERPL CALC-SCNC: 13 MMOL/L
APPEARANCE: ABNORMAL
AST SERPL-CCNC: 40 U/L
BACTERIA: ABNORMAL
BILIRUB SERPL-MCNC: 0.6 MG/DL
BILIRUBIN URINE: NEGATIVE
BLOOD URINE: NEGATIVE
BUN SERPL-MCNC: 13 MG/DL
CALCIUM SERPL-MCNC: 9.3 MG/DL
CHLORIDE SERPL-SCNC: 104 MMOL/L
CHOLEST SERPL-MCNC: 132 MG/DL
CHOLEST/HDLC SERPL: 3.5 RATIO
CO2 SERPL-SCNC: 22 MMOL/L
COLOR: YELLOW
CREAT SERPL-MCNC: 0.69 MG/DL
GLUCOSE QUALITATIVE U: NEGATIVE MG/DL
GLUCOSE SERPL-MCNC: 129 MG/DL
HBA1C MFR BLD HPLC: 6.6 %
HDLC SERPL-MCNC: 38 MG/DL
HYALINE CASTS: 2 /LPF
KETONES URINE: NEGATIVE
LDLC SERPL CALC-MCNC: 58 MG/DL
LDLC SERPL DIRECT ASSAY-MCNC: 73 MG/DL
LEUKOCYTE ESTERASE URINE: NEGATIVE
MICROSCOPIC-UA: NORMAL
NITRITE URINE: NEGATIVE
PH URINE: 5.5
POTASSIUM SERPL-SCNC: 4 MMOL/L
PROT SERPL-MCNC: 7.3 G/DL
PROTEIN URINE: NEGATIVE MG/DL
RED BLOOD CELLS URINE: 4 /HPF
SAVE SPECIMEN: NORMAL
SODIUM SERPL-SCNC: 139 MMOL/L
SPECIFIC GRAVITY URINE: 1.03
SQUAMOUS EPITHELIAL CELLS: 19 /HPF
TRIGL SERPL-MCNC: 182 MG/DL
UROBILINOGEN URINE: NEGATIVE MG/DL
WHITE BLOOD CELLS URINE: 6 /HPF

## 2018-12-12 ENCOUNTER — APPOINTMENT (OUTPATIENT)
Dept: DERMATOLOGY | Facility: CLINIC | Age: 48
End: 2018-12-12
Payer: MEDICAID

## 2018-12-12 VITALS — SYSTOLIC BLOOD PRESSURE: 110 MMHG | DIASTOLIC BLOOD PRESSURE: 82 MMHG

## 2018-12-12 DIAGNOSIS — L57.0 ACTINIC KERATOSIS: ICD-10-CM

## 2018-12-12 DIAGNOSIS — L30.4 ERYTHEMA INTERTRIGO: ICD-10-CM

## 2018-12-12 DIAGNOSIS — L30.9 DERMATITIS, UNSPECIFIED: ICD-10-CM

## 2018-12-12 DIAGNOSIS — L21.9 SEBORRHEIC DERMATITIS, UNSPECIFIED: ICD-10-CM

## 2018-12-12 PROCEDURE — 99213 OFFICE O/P EST LOW 20 MIN: CPT | Mod: 25

## 2018-12-12 PROCEDURE — 17000 DESTRUCT PREMALG LESION: CPT

## 2018-12-12 RX ORDER — TRIAMCINOLONE ACETONIDE 1 MG/G
0.1 OINTMENT TOPICAL
Qty: 1 | Refills: 0 | Status: ACTIVE | COMMUNITY
Start: 2018-12-12 | End: 1900-01-01

## 2018-12-12 RX ORDER — KETOCONAZOLE 20 MG/G
2 CREAM TOPICAL
Qty: 1 | Refills: 0 | Status: ACTIVE | COMMUNITY
Start: 2018-12-12 | End: 1900-01-01

## 2018-12-13 PROBLEM — L57.0 ACTINIC KERATOSIS: Status: ACTIVE | Noted: 2018-12-13

## 2018-12-26 PROBLEM — L30.9 DERMATITIS: Status: ACTIVE | Noted: 2018-12-26

## 2018-12-26 RX ORDER — KETOCONAZOLE 20 MG/G
2 CREAM TOPICAL
Qty: 1 | Refills: 0 | Status: ACTIVE | COMMUNITY
Start: 2018-12-26 | End: 1900-01-01

## 2019-01-06 ENCOUNTER — TRANSCRIPTION ENCOUNTER (OUTPATIENT)
Age: 49
End: 2019-01-06

## 2019-01-18 ENCOUNTER — APPOINTMENT (OUTPATIENT)
Dept: ENDOCRINOLOGY | Facility: CLINIC | Age: 49
End: 2019-01-18
Payer: MEDICAID

## 2019-01-18 VITALS
OXYGEN SATURATION: 97 % | WEIGHT: 165 LBS | BODY MASS INDEX: 30.36 KG/M2 | DIASTOLIC BLOOD PRESSURE: 70 MMHG | SYSTOLIC BLOOD PRESSURE: 110 MMHG | HEIGHT: 62 IN | HEART RATE: 69 BPM

## 2019-01-18 DIAGNOSIS — R73.03 PREDIABETES.: ICD-10-CM

## 2019-01-18 LAB
CREAT SPEC-SCNC: 88 MG/DL
MICROALBUMIN 24H UR DL<=1MG/L-MCNC: <1.2 MG/DL
MICROALBUMIN/CREAT 24H UR-RTO: NORMAL

## 2019-01-18 PROCEDURE — 99205 OFFICE O/P NEW HI 60 MIN: CPT

## 2019-01-18 RX ORDER — BLOOD SUGAR DIAGNOSTIC
STRIP MISCELLANEOUS
Qty: 60 | Refills: 5 | Status: ACTIVE | COMMUNITY
Start: 2019-01-18 | End: 1900-01-01

## 2019-01-18 RX ORDER — BLOOD-GLUCOSE METER
KIT MISCELLANEOUS
Qty: 1 | Refills: 0 | Status: ACTIVE | COMMUNITY
Start: 2019-01-18 | End: 1900-01-01

## 2019-01-18 RX ORDER — LANCETS 28 GAUGE
EACH MISCELLANEOUS
Qty: 60 | Refills: 5 | Status: ACTIVE | COMMUNITY
Start: 2019-01-18 | End: 1900-01-01

## 2019-02-01 ENCOUNTER — APPOINTMENT (OUTPATIENT)
Dept: ENDOCRINOLOGY | Facility: CLINIC | Age: 49
End: 2019-02-01
Payer: MEDICAID

## 2019-02-01 PROCEDURE — G0108 DIAB MANAGE TRN  PER INDIV: CPT

## 2019-02-03 ENCOUNTER — TRANSCRIPTION ENCOUNTER (OUTPATIENT)
Age: 49
End: 2019-02-03

## 2019-02-04 ENCOUNTER — APPOINTMENT (OUTPATIENT)
Dept: INTERNAL MEDICINE | Facility: CLINIC | Age: 49
End: 2019-02-04
Payer: MEDICAID

## 2019-02-04 VITALS
DIASTOLIC BLOOD PRESSURE: 70 MMHG | TEMPERATURE: 98.6 F | BODY MASS INDEX: 29.26 KG/M2 | WEIGHT: 160 LBS | SYSTOLIC BLOOD PRESSURE: 120 MMHG

## 2019-02-04 DIAGNOSIS — R60.9 EDEMA, UNSPECIFIED: ICD-10-CM

## 2019-02-04 PROCEDURE — 36415 COLL VENOUS BLD VENIPUNCTURE: CPT

## 2019-02-04 PROCEDURE — 99213 OFFICE O/P EST LOW 20 MIN: CPT | Mod: 25

## 2019-02-06 LAB
ALBUMIN SERPL ELPH-MCNC: 4.6 G/DL
ALP BLD-CCNC: 99 U/L
ALT SERPL-CCNC: 38 U/L
ANION GAP SERPL CALC-SCNC: 12 MMOL/L
AST SERPL-CCNC: 26 U/L
BASOPHILS # BLD AUTO: 0.04 K/UL
BASOPHILS NFR BLD AUTO: 0.4 %
BILIRUB SERPL-MCNC: 0.5 MG/DL
BUN SERPL-MCNC: 9 MG/DL
CALCIUM SERPL-MCNC: 10.1 MG/DL
CHLORIDE SERPL-SCNC: 103 MMOL/L
CO2 SERPL-SCNC: 23 MMOL/L
CREAT SERPL-MCNC: 0.56 MG/DL
EOSINOPHIL # BLD AUTO: 0.17 K/UL
EOSINOPHIL NFR BLD AUTO: 1.7 %
ERYTHROCYTE [SEDIMENTATION RATE] IN BLOOD BY WESTERGREN METHOD: 10 MM/HR
GLUCOSE SERPL-MCNC: 143 MG/DL
HCT VFR BLD CALC: 46.2 %
HGB BLD-MCNC: 15.9 G/DL
IMM GRANULOCYTES NFR BLD AUTO: 0.2 %
LYMPHOCYTES # BLD AUTO: 1.41 K/UL
LYMPHOCYTES NFR BLD AUTO: 14.5 %
MAN DIFF?: NORMAL
MCHC RBC-ENTMCNC: 29.4 PG
MCHC RBC-ENTMCNC: 34.4 GM/DL
MCV RBC AUTO: 85.6 FL
MONOCYTES # BLD AUTO: 0.61 K/UL
MONOCYTES NFR BLD AUTO: 6.3 %
NEUTROPHILS # BLD AUTO: 7.48 K/UL
NEUTROPHILS NFR BLD AUTO: 76.9 %
PLATELET # BLD AUTO: 219 K/UL
POTASSIUM SERPL-SCNC: 4.3 MMOL/L
PROT SERPL-MCNC: 7.6 G/DL
RBC # BLD: 5.4 M/UL
RBC # FLD: 13.3 %
SAVE SPECIMEN: NORMAL
SODIUM SERPL-SCNC: 138 MMOL/L
WBC # FLD AUTO: 9.73 K/UL

## 2019-03-14 ENCOUNTER — TRANSCRIPTION ENCOUNTER (OUTPATIENT)
Age: 49
End: 2019-03-14

## 2019-04-04 ENCOUNTER — APPOINTMENT (OUTPATIENT)
Dept: INTERNAL MEDICINE | Facility: CLINIC | Age: 49
End: 2019-04-04
Payer: MEDICAID

## 2019-04-04 VITALS
DIASTOLIC BLOOD PRESSURE: 70 MMHG | SYSTOLIC BLOOD PRESSURE: 118 MMHG | TEMPERATURE: 99.3 F | BODY MASS INDEX: 29.44 KG/M2 | WEIGHT: 160 LBS | OXYGEN SATURATION: 97 % | HEIGHT: 62 IN | HEART RATE: 112 BPM

## 2019-04-04 DIAGNOSIS — R68.89 OTHER GENERAL SYMPTOMS AND SIGNS: ICD-10-CM

## 2019-04-04 DIAGNOSIS — Z87.09 PERSONAL HISTORY OF OTHER DISEASES OF THE RESPIRATORY SYSTEM: ICD-10-CM

## 2019-04-04 LAB — S PYO AG SPEC QL IA: NEGATIVE

## 2019-04-04 PROCEDURE — 87804 INFLUENZA ASSAY W/OPTIC: CPT | Mod: QW

## 2019-04-04 PROCEDURE — 87880 STREP A ASSAY W/OPTIC: CPT | Mod: QW

## 2019-04-04 PROCEDURE — 99213 OFFICE O/P EST LOW 20 MIN: CPT | Mod: 25

## 2019-04-05 LAB
FLU A RESULT: NOT DETECTED
FLU B RESULT: NOT DETECTED
RSV RESULT: NOT DETECTED

## 2019-04-08 LAB — BACTERIA THROAT CULT: NORMAL

## 2019-04-15 ENCOUNTER — RESULT CHARGE (OUTPATIENT)
Age: 49
End: 2019-04-15

## 2019-04-15 ENCOUNTER — APPOINTMENT (OUTPATIENT)
Dept: ENDOCRINOLOGY | Facility: CLINIC | Age: 49
End: 2019-04-15
Payer: MEDICAID

## 2019-04-15 PROCEDURE — G0108 DIAB MANAGE TRN  PER INDIV: CPT

## 2019-04-17 LAB — HBA1C MFR BLD HPLC: 6.3

## 2019-05-03 ENCOUNTER — APPOINTMENT (OUTPATIENT)
Dept: ENDOCRINOLOGY | Facility: CLINIC | Age: 49
End: 2019-05-03
Payer: MEDICAID

## 2019-05-03 VITALS
BODY MASS INDEX: 28.52 KG/M2 | HEIGHT: 62 IN | HEART RATE: 85 BPM | OXYGEN SATURATION: 98 % | WEIGHT: 155 LBS | DIASTOLIC BLOOD PRESSURE: 80 MMHG | SYSTOLIC BLOOD PRESSURE: 110 MMHG

## 2019-05-03 PROCEDURE — 99213 OFFICE O/P EST LOW 20 MIN: CPT

## 2019-05-03 RX ORDER — PREDNISONE 10 MG/1
10 TABLET ORAL
Qty: 20 | Refills: 0 | Status: DISCONTINUED | COMMUNITY
Start: 2019-02-04 | End: 2019-05-03

## 2019-05-03 NOTE — HISTORY OF PRESENT ILLNESS
[FreeTextEntry1] : SANYA LOERA is a 48 year old female with PMHx of chronic sinusitis and fibromyalgia.  \par She is here for follow up visit for Type 2 DM, well controlled.  \par Went to routine check up with Dr. Pineda.  Was found to have elvated A1C level of 6.6% and high cholesterol level. \par This was checked in Dec 2018.  She has never been treated with any diabetes medications in the past . She has no known microvascular or macrovascular complications.  She takes Gabapentin for fibromyalgia.  \par \par Reports recent stress, her father had a fall and fracture so she has been taking care of him and helping with driving her parents around.  She has a 17 year old son with ADHA, applying to college, he has a history of seizures and was diagnosed with seizures during REM sleep.  \par \par The following is a sample of patient’s daily diet/routine, last 24 hour recall.  \par Breakfast:no breakfast \par Lunch:cheeseburger, variable, sometimes salad, admits that usually is not eating on the healthier side\par Dinner: she doesn't cook, mostly ordering in.  Mostly Italian food, Mexican or Chinese, or Cypriot.  \par Current exercise: None, she walks about 0.5 to 1 miles.  \par Weight trend: Has been up and down. \par \par In regards to Her DM health maintenance: \par Last visit to ophthalmology was: Date - dilated eye exam annually \par Last visit to podiatry was: as needed. \par The patient is adherent to diabetic foot precautions:Yes\par Last A1c was:  Dec 2018 and it was 6.6%, April 2019\par Last LDL was: Dec 2018 73mg/dl. \par Last urine micro-albumin was: Jan 2019, negative\par She knows how to check glucose.  Her  has type 2 DM.  \par \par Regarding hyperlipidemia:She takes Atorvastatin 20mg daily.  \par \par Diet problems tried before: She had Bozman before and lost 20 lbs but now regained his weight. 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  \par She states that she hates cooking. She eats out a lot with her boss and sometimes they often ordered high Carb and high calories food.  She orders out almost every night.  She also eats fairly late at night time.

## 2019-05-03 NOTE — PHYSICAL EXAM
[Well Nourished] : well nourished [No Acute Distress] : no acute distress [Alert] : alert [Well Developed] : well developed [Normal Sclera/Conjunctiva] : normal sclera/conjunctiva [EOMI] : extra ocular movement intact [Thyroid Not Enlarged] : the thyroid was not enlarged [Normal Oropharynx] : the oropharynx was normal [No Proptosis] : no proptosis [No Thyroid Nodules] : there were no palpable thyroid nodules [No Respiratory Distress] : no respiratory distress [No Accessory Muscle Use] : no accessory muscle use [Normal S1, S2] : normal S1 and S2 [Normal Rate] : heart rate was normal  [Clear to Auscultation] : lungs were clear to auscultation bilaterally [Regular Rhythm] : with a regular rhythm [Pedal Pulses Normal] : the pedal pulses are present [No Edema] : there was no peripheral edema [Soft] : abdomen soft [Not Tender] : non-tender [Normal Bowel Sounds] : normal bowel sounds [Not Distended] : not distended [Anterior Cervical Nodes] : anterior cervical nodes [Post Cervical Nodes] : posterior cervical nodes [No Spinal Tenderness] : no spinal tenderness [Normal] : normal and non tender [Axillary Nodes] : axillary nodes [No Stigmata of Cushings Syndrome] : no stigmata of cushings syndrome [Spine Straight] : spine straight [Normal Gait] : normal gait [No Rash] : no rash [Normal Strength/Tone] : muscle strength and tone were normal [No Tremors] : no tremors [Oriented x3] : oriented to person, place, and time [Normal Reflexes] : deep tendon reflexes were 2+ and symmetric [Acanthosis Nigricans] : no acanthosis nigricans

## 2019-05-03 NOTE — ASSESSMENT
[Carbohydrate Consistent Diet] : carbohydrate consistent diet [Hypoglycemia Management] : hypoglycemia management [Diabetes Foot Care] : diabetes foot care [Self Monitoring of Blood Glucose] : self monitoring of blood glucose [Long Term Vascular Complications] : long term vascular complications of diabetes [FreeTextEntry1] : Ms. SANYA LOERA is a 48 year old F here for evaluation and treatment of Type 2 DM.  \par \par 1)Diabetes, type 2, currently controlled, A1C recently April 2019 was 6.3%\par We discussed starting medication vs. lifestyle modifications.  \par She has been making much better choices in terms of food\par She's also had decreased appetite and eating a little bit less, staying away from dairy.  \par Continue to walk as much as she can during walk.  \par \par 2)Blood Pressure 110/85\par Renal status:  urine microalbumin, negative Jan 2019\par \par 3)Cholesterol management\par Continue with statin therapy \par \par 4)Obesity\par Encouraged weight loss\par Discussed the importance of diet and exercise. \par \par Diabetes Health Care Maintenance\par - Opthalmology up to date: Yes\par -Podiatry up to date: No issues\par -Antiplatelet agent: no\par  -Urine microalbumin: check 01/18/2019 \par -ACE-I/ARB: no\par -Patient to call for persistent glucose < 70 or > 300\par -Discussed hypoglycemic protocol.  \par -Yearly flu vaccine recommended \par \par EDUCATION: Reviewed 'ABC' of diabetes management (respective goals in parentheses): A1C (<7), blood pressure (<140/90), and cholesterol (LDL <70)\par \par COMPLIANCE at present is estimated to be:  good \par FOLLOW UP: I recommend that frequency of visits for diabetes care be every 3 month \par \par

## 2019-08-01 ENCOUNTER — RX RENEWAL (OUTPATIENT)
Age: 49
End: 2019-08-01

## 2019-08-26 ENCOUNTER — APPOINTMENT (OUTPATIENT)
Dept: ENDOCRINOLOGY | Facility: CLINIC | Age: 49
End: 2019-08-26

## 2019-10-25 ENCOUNTER — APPOINTMENT (OUTPATIENT)
Dept: DERMATOLOGY | Facility: CLINIC | Age: 49
End: 2019-10-25

## 2020-02-11 ENCOUNTER — RX RENEWAL (OUTPATIENT)
Age: 50
End: 2020-02-11

## 2020-07-22 ENCOUNTER — APPOINTMENT (OUTPATIENT)
Dept: DERMATOLOGY | Facility: CLINIC | Age: 50
End: 2020-07-22

## 2020-09-15 NOTE — ED ADULT NURSE NOTE - FALLEN IN THE PAST
Assessment and Recommendations:  56y male w/ pmhx/ochx of A-Fib on warfarin who presents with cardiac arrest at work with downtime of about 25 minutes prior to ROSC. S/p therapeutic hypothermia. Found to have R perimesencephalic SAH of unclear etiology with cerebral angiogram on 8/10 negative for aneurysm. Now with resolved neurogenic/cardiogenic shock. Course complicated by GI bleed s/p PPI gtt, bleeding from scott s/p clot irrigation, prolonged respiratory failure s/p trach (8/24) and PEG (8/26). Currently with A-Fib with RVR and C diff colitis. Ophtho consulted as patient now reporting significant vision loss.     1. 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 Nisha Campbell, neuro-radiology, and findings from recent events involve the cerebral peduncle may explain findings of the right eye  - will review with neuro-ophthalmology attending as well  - Findings discussed with pt and primary team    2. ischemic/hemorrhagic infarcts of the visual pathway  - imaging reviewed as above, and patient may have had prior occipital lobe changes pre-dating his acute events that led to hospitalization, and with significant changes in right temporal lobe now possibly explaining poor vision and field restriction  - will review with neuro-ophthalmology attending as well    Patient was seen and discussed with attending Dr. Gilmore     Outpatient follow-up: Patient should follow-up with his/her ophthalmologist or with [facility] Department of Ophthalmology within 1 week of after discharge at:    [address]  [place] [address] [phone]    Oscar Spann MD, PGY-III  Pager: [phone]/[facility]: [phone]  Also available on Microsoft Teams Assessment and Recommendations:  56y male w/ pmhx/ochx of A-Fib on warfarin who presents with cardiac arrest at work with downtime of about 25 minutes prior to ROSC. S/p therapeutic hypothermia. Found to have R perimesencephalic SAH of unclear etiology with cerebral angiogram on 8/10 negative for aneurysm. Now with resolved neurogenic/cardiogenic shock. Course complicated by GI bleed s/p PPI gtt, bleeding from scott s/p clot irrigation, prolonged respiratory failure s/p trach (8/24) and PEG (8/26). Currently with A-Fib with RVR and C diff colitis. Ophtho consulted as patient now reporting significant vision loss.     1. 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  Anisocoria and dilated pupil OD has been present since admission.  Unable to assess ptosis or EOMs until recently as pt was intubated/sedated and poor mental status.    - imaging reviewed with Dr. Nisha Campbell, neuro-radiology, and findings from recent events involve the cerebral peduncle may explain findings of the right eye.  No evidence of PCOM aneurysm or mass on multiple angiograms.  - Findings discussed with pt and primary team    2. ischemic/hemorrhagic infarcts of the visual pathway with cardiac arrest  - imaging reviewed as above, and patient may have had prior occipital lobe changes pre-dating his acute events that led to hospitalization, and with significant changes in right temporal lobe now possibly explaining poor vision and field restriction.  Pt also had cardiac arrest and may have had a PION that may also be contributing to the decreased vision OU.    Findings and plan discussed with patient and primary team  Guarded prognosis  Will discuss with neuro-ophtho    Patient was seen and discussed with attending Dr. Gilmore     Outpatient follow-up: Patient should follow-up with his/her ophthalmologist or with [facility] Department of Ophthalmology within 1 week of after discharge at:    [address]  [place] [address]  [phone]    Oscar Spann MD, PGY-III  Pager: [phone]/[facility]: [id number]  Also available on Microsoft Teams Assessment and Recommendations:  56y male w/ pmhx/ochx of A-Fib on warfarin who presents with cardiac arrest at work with downtime of about 25 minutes prior to ROSC. S/p therapeutic hypothermia. Found to have R perimesencephalic SAH of unclear etiology with cerebral angiogram on 8/10 negative for aneurysm. Now with resolved neurogenic/cardiogenic shock. Course complicated by GI bleed s/p PPI gtt, bleeding from scott s/p clot irrigation, prolonged respiratory failure s/p trach (8/24) and PEG (8/26). Currently with A-Fib with RVR and C diff colitis. Ophtho consulted as patient now reporting significant vision loss.     1. 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  Anisocoria and dilated pupil OD has been present since admission.  Unable to assess ptosis or EOMs until recently as pt was intubated/sedated and poor mental status.    - imaging reviewed with Dr. Nisha Campbell, neuro-radiology, and findings from recent events involve the cerebral peduncle may explain findings of the right eye.  No evidence of PCOM aneurysm or mass on multiple angiograms.  - Findings discussed with pt and primary team    2. ischemic/hemorrhagic infarcts of the visual pathway with cardiac arrest  - imaging reviewed as above, and patient may have had prior occipital lobe changes pre-dating his acute events that led to hospitalization, and with significant changes in right temporal lobe now possibly explaining poor vision and field restriction.  Pt also had cardiac arrest and may have had a PION that may also be contributing to the decreased vision OU.    Findings and plan discussed with patient and primary team  Guarded prognosis  Discussed with neuro-ophtho attending, Dr. Coto who agrees with outpatient follow-up.    Patient was seen and discussed with attending Dr. Gilmore       Outpatient follow-up: Patient should follow-up with his/her ophthalmologist or with [facility] Department of Ophthalmology within 1 week of after discharge at:    [address]  [place] [address]  [phone]    Oscar Spann MD, PGY-III  Pager: [phone]/[facility]: [phone]  Also available on Microsoft Teams no

## 2020-09-25 ENCOUNTER — APPOINTMENT (OUTPATIENT)
Dept: INTERNAL MEDICINE | Facility: CLINIC | Age: 50
End: 2020-09-25
Payer: MEDICAID

## 2020-09-25 PROCEDURE — 99442: CPT

## 2020-09-30 NOTE — HISTORY OF PRESENT ILLNESS
[FreeTextEntry8] : CC: COVID pos\par Last Tues, pt dev cough.  pt took son to ENT.  and ENT told ther to afrin and pt went to work.  3-4 days ago - dev loss of sense of smell and taste.  pt has a lot of congestion and still has cough.  ENT did the COVID test on Wed and today ENT called her w pos COVID test\par Pt works at [employer]. \par pt had no fever but has chills\par pt notified her employer\par no SOB.  pt had intermittent mild diarrhea- since 4-5 days- 1 episode.\par pt feel slightly better. pt lives with her son,  who are doing well. still occasion cough\par \par COVID-19 Education:\par \par The patient is suspected of having COVID-19. \par Signs and symptoms were discussed.  Patient educated to self-isolate in a room in the home away from others.  Mask if available.  Patient advised not to leave the home except for testing.   Instructions to have testing performed at a [facility] urgent care location were given to the patient.  \par \par Self-treatment discussed including Tylenol for fever, pain and myalgia; cough and cold medications for symptoms.  Patient to check temperature daily.  Monitor for symptoms of respiratory distress.  To notify our office with important status updates.\par \par Nature of disease to cause severe respiratory distress day 8 or 9 discussed.  If needs emergent care to notify EMS or ED or our office that he may have COVID to allow for proper PPE and isolation.  \par rec quarantine until if symptoms continue to improve.  call if SOB, fever or symptoms not improving.  pt and  will notify contacts.  son is remote learing.\par Phone Time Documentation:\par Spent __11_   minutes with patient on phone and >50% of the time spent in the encounter involved counseling and coordination of care for any or all of the diagnosis submitted\par \par \par

## 2020-10-01 ENCOUNTER — APPOINTMENT (OUTPATIENT)
Dept: INTERNAL MEDICINE | Facility: CLINIC | Age: 50
End: 2020-10-01
Payer: MEDICAID

## 2020-10-01 PROCEDURE — 99213 OFFICE O/P EST LOW 20 MIN: CPT | Mod: 95

## 2020-10-01 NOTE — ASSESSMENT
[FreeTextEntry1] : \par \par imp---COVID-19 infection---minimally symptomatic post 14 day quarantine period\par \par plan---probably can return to work on clinical grounds but since she has not been examined,\par            suggested Urgent Care visit with cxr.\par            Letter of "clearance" to return to work can be written depending on findings of U.C. visit.

## 2020-10-01 NOTE — HISTORY OF PRESENT ILLNESS
[Home] : at home, [unfilled] , at the time of the visit. [Medical Office: ([facility])___] : at the medical office located in  [Spouse] : spouse [Verbal consent obtained from patient] : the patient, [unfilled]

## 2020-10-01 NOTE — REASON FOR VISIT
[Follow-Up - Clinic] : a clinic follow-up of [FreeTextEntry1] : \par \par Pt. recently dxd with COVID 19 illness; initial clinical course outlined in Dr. Jain's note;\par Her sx. have largely abated with the exception of an occasional, nonproductive cough;\par she specifically denies fever or SOB (she never had former); her loss of taste and smell\par has normalized and she no longer has diarrhea\par completed 14 days of quarantine and feels she is ready to return to work but her employer\par wants a letter "clearance"

## 2020-10-02 ENCOUNTER — APPOINTMENT (OUTPATIENT)
Dept: INTERNAL MEDICINE | Facility: CLINIC | Age: 50
End: 2020-10-02

## 2020-10-26 ENCOUNTER — APPOINTMENT (OUTPATIENT)
Dept: INTERNAL MEDICINE | Facility: CLINIC | Age: 50
End: 2020-10-26
Payer: MEDICAID

## 2020-10-26 ENCOUNTER — NON-APPOINTMENT (OUTPATIENT)
Age: 50
End: 2020-10-26

## 2020-10-26 VITALS
HEART RATE: 89 BPM | DIASTOLIC BLOOD PRESSURE: 80 MMHG | OXYGEN SATURATION: 98 % | SYSTOLIC BLOOD PRESSURE: 110 MMHG | BODY MASS INDEX: 29.44 KG/M2 | TEMPERATURE: 98.2 F | WEIGHT: 160 LBS | HEIGHT: 62 IN

## 2020-10-26 VITALS — DIASTOLIC BLOOD PRESSURE: 94 MMHG | SYSTOLIC BLOOD PRESSURE: 120 MMHG

## 2020-10-26 DIAGNOSIS — U07.1 COVID-19: ICD-10-CM

## 2020-10-26 PROCEDURE — 36415 COLL VENOUS BLD VENIPUNCTURE: CPT

## 2020-10-26 PROCEDURE — 93000 ELECTROCARDIOGRAM COMPLETE: CPT

## 2020-10-26 PROCEDURE — G0008: CPT

## 2020-10-26 PROCEDURE — 99072 ADDL SUPL MATRL&STAF TM PHE: CPT

## 2020-10-26 PROCEDURE — 90686 IIV4 VACC NO PRSV 0.5 ML IM: CPT

## 2020-10-26 PROCEDURE — 99214 OFFICE O/P EST MOD 30 MIN: CPT | Mod: 25

## 2020-10-26 NOTE — ASSESSMENT
[FreeTextEntry1] : \par \par ecg---SR; WNL\par \par imp---COVID-19 virus infection---w/o residual sx. except for mild dysgeusia\par           hyperlipidemia---panel to be checked on statin\par           t2dm---last A1C (April 2019)=6.3\par \par plan---COVID-19 ab testing\par            flu vaccine administered\par            FBW:    CBC, CMP, A1C, lipids, direct LDL\par            CPE in Feb. 2021\par      \par    \par

## 2020-10-26 NOTE — REASON FOR VISIT
[Follow-Up - Clinic] : a clinic follow-up of [FreeTextEntry1] : \par \par see hx. per phone note of Oct. 5th\par had COVD-19 illness; pos. antigen test Sept. 25th;\par no residual sx....e.g. cough, SOB, diarrhea.\par \par SH---back to work in office 2 weeks ago (had quarantined for 14+ days)-\par          works for [employer]

## 2020-10-27 DIAGNOSIS — K76.0 FATTY (CHANGE OF) LIVER, NOT ELSEWHERE CLASSIFIED: ICD-10-CM

## 2020-10-27 LAB
ALBUMIN SERPL ELPH-MCNC: 4.6 G/DL
ALP BLD-CCNC: 94 U/L
ALT SERPL-CCNC: 53 U/L
ANION GAP SERPL CALC-SCNC: 17 MMOL/L
AST SERPL-CCNC: 31 U/L
BASOPHILS # BLD AUTO: 0.09 K/UL
BASOPHILS NFR BLD AUTO: 1.3 %
BILIRUB SERPL-MCNC: 0.4 MG/DL
BUN SERPL-MCNC: 14 MG/DL
CALCIUM SERPL-MCNC: 9.9 MG/DL
CHLORIDE SERPL-SCNC: 101 MMOL/L
CO2 SERPL-SCNC: 21 MMOL/L
CREAT SERPL-MCNC: 0.85 MG/DL
EOSINOPHIL # BLD AUTO: 0.17 K/UL
EOSINOPHIL NFR BLD AUTO: 2.5 %
ESTIMATED AVERAGE GLUCOSE: 126 MG/DL
GLUCOSE SERPL-MCNC: 104 MG/DL
HBA1C MFR BLD HPLC: 6 %
HCT VFR BLD CALC: 46.8 %
HGB BLD-MCNC: 15.7 G/DL
IMM GRANULOCYTES NFR BLD AUTO: 0.4 %
LYMPHOCYTES # BLD AUTO: 1.55 K/UL
LYMPHOCYTES NFR BLD AUTO: 23.1 %
MAN DIFF?: NORMAL
MCHC RBC-ENTMCNC: 28.6 PG
MCHC RBC-ENTMCNC: 33.5 GM/DL
MCV RBC AUTO: 85.2 FL
MONOCYTES # BLD AUTO: 0.66 K/UL
MONOCYTES NFR BLD AUTO: 9.8 %
NEUTROPHILS # BLD AUTO: 4.22 K/UL
NEUTROPHILS NFR BLD AUTO: 62.9 %
PLATELET # BLD AUTO: 236 K/UL
POTASSIUM SERPL-SCNC: 4.4 MMOL/L
PROT SERPL-MCNC: 7.3 G/DL
RBC # BLD: 5.49 M/UL
RBC # FLD: 13.2 %
SAVE SPECIMEN: NORMAL
SODIUM SERPL-SCNC: 139 MMOL/L
WBC # FLD AUTO: 6.72 K/UL

## 2020-12-16 PROBLEM — J06.9 VIRAL URI WITH COUGH: Status: RESOLVED | Noted: 2018-10-17 | Resolved: 2020-12-16

## 2020-12-21 PROBLEM — Z87.09 HISTORY OF SORE THROAT: Status: RESOLVED | Noted: 2019-04-04 | Resolved: 2020-12-21

## 2021-02-26 ENCOUNTER — APPOINTMENT (OUTPATIENT)
Dept: INTERNAL MEDICINE | Facility: CLINIC | Age: 51
End: 2021-02-26
Payer: MEDICAID

## 2021-02-26 LAB
APPEARANCE: CLEAR
BACTERIA: NEGATIVE
BASOPHILS # BLD AUTO: 0.08 K/UL
BASOPHILS NFR BLD AUTO: 1.1 %
BILIRUBIN URINE: NEGATIVE
BLOOD URINE: NEGATIVE
COLOR: NORMAL
EOSINOPHIL # BLD AUTO: 0.19 K/UL
EOSINOPHIL NFR BLD AUTO: 2.6 %
GLUCOSE QUALITATIVE U: NEGATIVE
HCT VFR BLD CALC: 47.8 %
HGB BLD-MCNC: 16.5 G/DL
HYALINE CASTS: 2 /LPF
IMM GRANULOCYTES NFR BLD AUTO: 1.4 %
KETONES URINE: NEGATIVE
LEUKOCYTE ESTERASE URINE: NEGATIVE
LYMPHOCYTES # BLD AUTO: 1.48 K/UL
LYMPHOCYTES NFR BLD AUTO: 20.5 %
MAN DIFF?: NORMAL
MCHC RBC-ENTMCNC: 29.4 PG
MCHC RBC-ENTMCNC: 34.5 GM/DL
MCV RBC AUTO: 85.2 FL
MICROSCOPIC-UA: NORMAL
MONOCYTES # BLD AUTO: 0.58 K/UL
MONOCYTES NFR BLD AUTO: 8 %
NEUTROPHILS # BLD AUTO: 4.78 K/UL
NEUTROPHILS NFR BLD AUTO: 66.4 %
NITRITE URINE: NEGATIVE
PH URINE: 6
PLATELET # BLD AUTO: 212 K/UL
PROTEIN URINE: NORMAL
RBC # BLD: 5.61 M/UL
RBC # FLD: 13.2 %
RED BLOOD CELLS URINE: 7 /HPF
SAVE SPECIMEN: NORMAL
SPECIFIC GRAVITY URINE: 1.02
SQUAMOUS EPITHELIAL CELLS: 11 /HPF
UROBILINOGEN URINE: NORMAL
WBC # FLD AUTO: 7.21 K/UL
WHITE BLOOD CELLS URINE: 3 /HPF

## 2021-02-26 PROCEDURE — 99072 ADDL SUPL MATRL&STAF TM PHE: CPT

## 2021-02-27 LAB
ALBUMIN SERPL ELPH-MCNC: 4.3 G/DL
ALP BLD-CCNC: 78 U/L
ALT SERPL-CCNC: 45 U/L
ANION GAP SERPL CALC-SCNC: 13 MMOL/L
AST SERPL-CCNC: 29 U/L
BILIRUB SERPL-MCNC: 0.5 MG/DL
BUN SERPL-MCNC: 13 MG/DL
CALCIUM SERPL-MCNC: 9.6 MG/DL
CHLORIDE SERPL-SCNC: 102 MMOL/L
CHOLEST SERPL-MCNC: 220 MG/DL
CO2 SERPL-SCNC: 22 MMOL/L
CREAT SERPL-MCNC: 0.63 MG/DL
ERYTHROCYTE [SEDIMENTATION RATE] IN BLOOD BY WESTERGREN METHOD: 11 MM/HR
ESTIMATED AVERAGE GLUCOSE: 134 MG/DL
GLUCOSE SERPL-MCNC: 120 MG/DL
HBA1C MFR BLD HPLC: 6.3 %
HDLC SERPL-MCNC: 39 MG/DL
LDLC SERPL CALC-MCNC: 139 MG/DL
LDLC SERPL DIRECT ASSAY-MCNC: 145 MG/DL
NONHDLC SERPL-MCNC: 181 MG/DL
POTASSIUM SERPL-SCNC: 4.3 MMOL/L
PROT SERPL-MCNC: 7.5 G/DL
SARS-COV-2 IGG SERPL IA-ACNC: 137 INDEX
SARS-COV-2 IGG SERPL QL IA: POSITIVE
SODIUM SERPL-SCNC: 137 MMOL/L
T4 FREE SERPL-MCNC: 1.3 NG/DL
TRIGL SERPL-MCNC: 212 MG/DL
TSH SERPL-ACNC: 2.27 UIU/ML

## 2021-03-08 ENCOUNTER — APPOINTMENT (OUTPATIENT)
Dept: INTERNAL MEDICINE | Facility: CLINIC | Age: 51
End: 2021-03-08
Payer: MEDICAID

## 2021-03-08 VITALS
HEART RATE: 93 BPM | DIASTOLIC BLOOD PRESSURE: 72 MMHG | BODY MASS INDEX: 30.91 KG/M2 | OXYGEN SATURATION: 98 % | RESPIRATION RATE: 17 BRPM | HEIGHT: 62 IN | WEIGHT: 168 LBS | SYSTOLIC BLOOD PRESSURE: 116 MMHG

## 2021-03-08 VITALS — DIASTOLIC BLOOD PRESSURE: 88 MMHG | SYSTOLIC BLOOD PRESSURE: 130 MMHG

## 2021-03-08 DIAGNOSIS — R31.29 OTHER MICROSCOPIC HEMATURIA: ICD-10-CM

## 2021-03-08 DIAGNOSIS — E78.5 HYPERLIPIDEMIA, UNSPECIFIED: ICD-10-CM

## 2021-03-08 DIAGNOSIS — Z00.00 ENCOUNTER FOR GENERAL ADULT MEDICAL EXAMINATION W/OUT ABNORMAL FINDINGS: ICD-10-CM

## 2021-03-08 DIAGNOSIS — E66.9 OBESITY, UNSPECIFIED: ICD-10-CM

## 2021-03-08 PROCEDURE — 99396 PREV VISIT EST AGE 40-64: CPT | Mod: 25

## 2021-03-08 PROCEDURE — 99072 ADDL SUPL MATRL&STAF TM PHE: CPT

## 2021-03-08 PROCEDURE — 93000 ELECTROCARDIOGRAM COMPLETE: CPT

## 2021-03-08 PROCEDURE — 71046 X-RAY EXAM CHEST 2 VIEWS: CPT

## 2021-03-09 ENCOUNTER — NON-APPOINTMENT (OUTPATIENT)
Age: 51
End: 2021-03-09

## 2021-03-11 LAB — URINE CYTOLOGY: NORMAL

## 2021-04-02 ENCOUNTER — OUTPATIENT (OUTPATIENT)
Dept: OUTPATIENT SERVICES | Facility: HOSPITAL | Age: 51
LOS: 1 days | End: 2021-04-02
Payer: MEDICAID

## 2021-04-02 ENCOUNTER — APPOINTMENT (OUTPATIENT)
Dept: ULTRASOUND IMAGING | Facility: CLINIC | Age: 51
End: 2021-04-02
Payer: MEDICAID

## 2021-04-02 DIAGNOSIS — R31.29 OTHER MICROSCOPIC HEMATURIA: ICD-10-CM

## 2021-04-02 DIAGNOSIS — Z90.710 ACQUIRED ABSENCE OF BOTH CERVIX AND UTERUS: Chronic | ICD-10-CM

## 2021-04-02 PROCEDURE — 76775 US EXAM ABDO BACK WALL LIM: CPT

## 2021-04-02 PROCEDURE — 76775 US EXAM ABDO BACK WALL LIM: CPT | Mod: 26

## 2021-04-05 NOTE — ASSESSMENT
[FreeTextEntry1] : \par \par cxr---pa/lat; kyphoscoliosis; NAPD\par ecg---SR; WNL\par \par imp--controlled type 2 diabetes mellitus---A1C=6.3; consistent with prior levels\par         hyperlipidemia---marked increase in total and LDL cholesterol; pt. not taking statin\par         GERD---with certain foods, e.g. tomato sauce; minimizes occurrence by paying attention to diet\par         microscopic hematuria---has had in past; asx.\par         obesity---8# interval weight gain (since 10/2020); BMI=30.7\par \par plan---FBW reviewed with pt.\par            dietary counseling re: reducing carb. intake and weight\par            [facility]\par            urine cytology\par            Prilosec  OTC 20 mg\par            resume rosuvastatin\par            colonoscopy\par

## 2021-04-05 NOTE — HISTORY OF PRESENT ILLNESS
[FreeTextEntry1] : \par \par here for CPE [de-identified] : \par \par generally well;\par COVID-19 illness in Sept. of 2020; cough, congestion, lethargic, loss of taste/smell (had metallic taste in mouth);\par fully recovered from all of above.\par \par GERD---taking AlkaSeltzer; eliminated tomato sauce, iced tea, etc. and feels fine\par \par SH---19 y.o. son ; ADHD, LD

## 2021-04-05 NOTE — HEALTH RISK ASSESSMENT
[Good] : ~his/her~  mood as  good [No] : In the past 12 months have you used drugs other than those required for medical reasons? No [No falls in past year] : Patient reported no falls in the past year [0] : 2) Feeling down, depressed, or hopeless: Not at all (0) [None] : None [With Family] : lives with family [# of Members in Household ___] :  household currently consist of [unfilled] member(s) [Employed] : employed [High School] : high school [Feels Safe at Home] : Feels safe at home [Fully functional (bathing, dressing, toileting, transferring, walking, feeding)] : Fully functional (bathing, dressing, toileting, transferring, walking, feeding) [Fully functional (using the telephone, shopping, preparing meals, housekeeping, doing laundry, using] : Fully functional and needs no help or supervision to perform IADLs (using the telephone, shopping, preparing meals, housekeeping, doing laundry, using transportation, managing medications and managing finances) [Reports changes in hearing] : Reports changes in hearing [Carbon Monoxide Detector] : carbon monoxide detector [Seat Belt] :  uses seat belt [Sunscreen] : uses sunscreen [I will adhere to the patient's wishes as expressed in the advance directive except as noted below.] : I will adhere to the patient's wishes as expressed in the advance directive except as noted below [] : No [de-identified] : none [de-identified] : not exercising [de-identified] : ad nena [[id number]] : 0 [Change in mental status noted] : No change in mental status noted [High Risk Behavior] : no high risk behavior [Reports changes in vision] : Reports no changes in vision [Reports changes in dental health] : Reports no changes in dental health [Guns at Home] : no guns at home [Travel to Developing Areas] : does not  travel to developing areas [TB Exposure] : is not being exposed to tuberculosis [MammogramDate] : 09/21 [MammogramComments] : 6 mo. f/u with bilat. [place] [BoneDensityDate] : a few yrs. ago [BoneDensityComments] : Dr. Avram Goldberg [ColonoscopyDate] : has not had/ [FreeTextEntry2] : works for [employer] [de-identified] : to get tested [AdvancecareDate] : 03/21

## 2021-04-20 ENCOUNTER — NON-APPOINTMENT (OUTPATIENT)
Age: 51
End: 2021-04-20

## 2021-05-24 ENCOUNTER — NON-APPOINTMENT (OUTPATIENT)
Age: 51
End: 2021-05-24

## 2021-05-24 ENCOUNTER — APPOINTMENT (OUTPATIENT)
Dept: INTERNAL MEDICINE | Facility: CLINIC | Age: 51
End: 2021-05-24
Payer: MEDICAID

## 2021-05-24 VITALS
HEIGHT: 62 IN | HEART RATE: 76 BPM | WEIGHT: 168 LBS | BODY MASS INDEX: 30.91 KG/M2 | OXYGEN SATURATION: 98 % | TEMPERATURE: 97.8 F

## 2021-05-24 VITALS — SYSTOLIC BLOOD PRESSURE: 134 MMHG | DIASTOLIC BLOOD PRESSURE: 80 MMHG

## 2021-05-24 DIAGNOSIS — Z01.818 ENCOUNTER FOR OTHER PREPROCEDURAL EXAMINATION: ICD-10-CM

## 2021-05-24 DIAGNOSIS — K21.9 GASTRO-ESOPHAGEAL REFLUX DISEASE W/OUT ESOPHAGITIS: ICD-10-CM

## 2021-05-24 DIAGNOSIS — G56.01 CARPAL TUNNEL SYNDROME, RIGHT UPPER LIMB: ICD-10-CM

## 2021-05-24 PROCEDURE — 99213 OFFICE O/P EST LOW 20 MIN: CPT | Mod: 25

## 2021-05-24 PROCEDURE — 93000 ELECTROCARDIOGRAM COMPLETE: CPT

## 2021-05-26 LAB
ALBUMIN SERPL ELPH-MCNC: 4.4 G/DL
ALP BLD-CCNC: 98 U/L
ALT SERPL-CCNC: 50 U/L
ANION GAP SERPL CALC-SCNC: 12 MMOL/L
AST SERPL-CCNC: 33 U/L
BASOPHILS # BLD AUTO: 0.07 K/UL
BASOPHILS NFR BLD AUTO: 1.1 %
BILIRUB SERPL-MCNC: 0.4 MG/DL
BUN SERPL-MCNC: 14 MG/DL
CALCIUM SERPL-MCNC: 9.8 MG/DL
CHLORIDE SERPL-SCNC: 102 MMOL/L
CO2 SERPL-SCNC: 24 MMOL/L
CREAT SERPL-MCNC: 0.7 MG/DL
EOSINOPHIL # BLD AUTO: 0.2 K/UL
EOSINOPHIL NFR BLD AUTO: 3.2 %
ESTIMATED AVERAGE GLUCOSE: 131 MG/DL
GLUCOSE SERPL-MCNC: 130 MG/DL
HBA1C MFR BLD HPLC: 6.2 %
HCT VFR BLD CALC: 46.2 %
HGB BLD-MCNC: 16 G/DL
IMM GRANULOCYTES NFR BLD AUTO: 0.5 %
LYMPHOCYTES # BLD AUTO: 1.46 K/UL
LYMPHOCYTES NFR BLD AUTO: 23.6 %
MAN DIFF?: NORMAL
MCHC RBC-ENTMCNC: 29.5 PG
MCHC RBC-ENTMCNC: 34.6 GM/DL
MCV RBC AUTO: 85.2 FL
MONOCYTES # BLD AUTO: 0.56 K/UL
MONOCYTES NFR BLD AUTO: 9 %
NEUTROPHILS # BLD AUTO: 3.87 K/UL
NEUTROPHILS NFR BLD AUTO: 62.6 %
PLATELET # BLD AUTO: 212 K/UL
POTASSIUM SERPL-SCNC: 4.5 MMOL/L
PROT SERPL-MCNC: 7.4 G/DL
RBC # BLD: 5.42 M/UL
RBC # FLD: 12.3 %
SODIUM SERPL-SCNC: 137 MMOL/L
WBC # FLD AUTO: 6.19 K/UL

## 2022-01-08 ENCOUNTER — RX RENEWAL (OUTPATIENT)
Age: 52
End: 2022-01-08

## 2022-01-08 RX ORDER — ATORVASTATIN CALCIUM 20 MG/1
20 TABLET, FILM COATED ORAL
Qty: 90 | Refills: 3 | Status: ACTIVE | COMMUNITY
Start: 2018-05-08 | End: 1900-01-01

## 2022-08-09 ENCOUNTER — NON-APPOINTMENT (OUTPATIENT)
Age: 52
End: 2022-08-09

## 2022-09-28 ENCOUNTER — APPOINTMENT (OUTPATIENT)
Dept: INTERNAL MEDICINE | Facility: CLINIC | Age: 52
End: 2022-09-28

## 2022-09-28 VITALS
HEIGHT: 62 IN | BODY MASS INDEX: 28.89 KG/M2 | SYSTOLIC BLOOD PRESSURE: 124 MMHG | HEART RATE: 101 BPM | TEMPERATURE: 98 F | OXYGEN SATURATION: 98 % | DIASTOLIC BLOOD PRESSURE: 86 MMHG | WEIGHT: 157 LBS

## 2022-09-28 DIAGNOSIS — H53.8 OTHER VISUAL DISTURBANCES: ICD-10-CM

## 2022-09-28 PROCEDURE — 99212 OFFICE O/P EST SF 10 MIN: CPT

## 2022-09-28 NOTE — PHYSICAL EXAM
[Well Nourished] : well nourished [Well Developed] : well developed [Alert and Oriented x3] : oriented to person, place, and time [Normal Mood] : the mood was normal

## 2022-09-28 NOTE — HISTORY OF PRESENT ILLNESS
[FreeTextEntry8] : Pt presents for referrals for \par ophthalmologist - general check up\par urologist- prolapse of bladder\par \par Pt reports feeling well today.

## 2022-09-30 ENCOUNTER — APPOINTMENT (OUTPATIENT)
Dept: UROLOGY | Facility: CLINIC | Age: 52
End: 2022-09-30

## 2022-09-30 VITALS
BODY MASS INDEX: 28.89 KG/M2 | HEIGHT: 62 IN | DIASTOLIC BLOOD PRESSURE: 83 MMHG | HEART RATE: 89 BPM | WEIGHT: 157 LBS | TEMPERATURE: 98.2 F | SYSTOLIC BLOOD PRESSURE: 131 MMHG

## 2022-09-30 DIAGNOSIS — N39.46 MIXED INCONTINENCE: ICD-10-CM

## 2022-09-30 DIAGNOSIS — N81.11 CYSTOCELE, MIDLINE: ICD-10-CM

## 2022-09-30 PROCEDURE — 99204 OFFICE O/P NEW MOD 45 MIN: CPT

## 2022-09-30 RX ORDER — SOLIFENACIN SUCCINATE 10 MG/1
10 TABLET ORAL
Qty: 30 | Refills: 3 | Status: ACTIVE | COMMUNITY
Start: 2022-09-30 | End: 1900-01-01

## 2022-09-30 NOTE — REVIEW OF SYSTEMS
[Heartburn] : heartburn [Loss of interest] : loss of interest in sexual activity [Seen by urologist before (Name)  ___] : Previously seen by a urologist: [unfilled] [Wake up at night to urinate  How many times?  ___] : wakes up to urinate [unfilled] times during the night [Strong urge to urinate] : strong urge to urinate [Leakage of urine with urgency] : leakage of urine with urgency [Leakage of urine with straining, coughing, laughing] : leakage of urine with straining, coughing, laughing [Joint Pain] : joint pain [Negative] : Heme/Lymph [see HPI] : see HPI [Hot Flashes] : hot flashes [Muscle Weakness] : muscle weakness [FreeTextEntry6] : frequent voids in day

## 2022-09-30 NOTE — ASSESSMENT
[FreeTextEntry1] : patient  ( vag)  wth c/omixed INC for abuot 5 years \par some weight loss\par no bowl issues\par not sexuall active\par seen by GYN and told cystocele \par no hx of recurrent UTIS or back pain \par last JEANETH ( ) no hydro or stones \par also with stres INC wih cough, laugh , etc - tries to do Kegels\par and c/o urge INC \par uses pull ups for INC \par denies hematuria \par no meds tried\par here for further eval:\par 1-check urien\par 2- JEANETH albeit cystocele small \par 3- UDS due to mixed INC however she woyuld like to try meds for urge \par 4- mandeep lgive Vesicare 10 mg - risks and benefits discussed \par 5-cont weigh loss andkegels

## 2022-09-30 NOTE — HISTORY OF PRESENT ILLNESS
[FreeTextEntry1] : patient  ( vag)  wth c/omixed INC for abuot 5 years \par some weight loss\par no bowl issues\par not sexuall active\par seen by GYN and told cystocele \par no hx of recurrent UTIS or back pain \par last JEANETH ( ) no hydro or stones \par also with stres INC wih cough, laugh , etc - tries to do Kegels\par and c/o urge INC \par uses pull ups for INC \par denies hematuria \par no meds tried\par here for further eval:

## 2022-09-30 NOTE — PHYSICAL EXAM
[General Appearance - Well Developed] : well developed [General Appearance - Well Nourished] : well nourished [Normal Appearance] : normal appearance [Well Groomed] : well groomed [General Appearance - In No Acute Distress] : no acute distress [Edema] : no peripheral edema [Respiration, Rhythm And Depth] : normal respiratory rhythm and effort [Exaggerated Use Of Accessory Muscles For Inspiration] : no accessory muscle use [Abdomen Soft] : soft [Abdomen Tenderness] : non-tender [Abdomen Mass (___ Cm)] : no abdominal mass palpated [Abdomen Hernia] : no hernia was discovered [Urethral Meatus] : normal urethra [External Female Genitalia] : normal external genitalia [Vagina] : normal vaginal exam [FreeTextEntry1] : soft urethra, urethra and bladder not tender, no stool felt vag in rectal vaut, grade 1 cystocele with valsalva, noINC seen with valsalve ( pvrt low at 21 ml ) , no disch noted [Normal Station and Gait] : the gait and station were normal for the patient's age [] : no rash [No Focal Deficits] : no focal deficits [Oriented To Time, Place, And Person] : oriented to person, place, and time [Affect] : the affect was normal [Mood] : the mood was normal [Not Anxious] : not anxious [No Palpable Adenopathy] : no palpable adenopathy

## 2022-10-03 LAB
APPEARANCE: ABNORMAL
BACTERIA UR CULT: NORMAL
BACTERIA: NEGATIVE
BILIRUBIN URINE: NEGATIVE
BLOOD URINE: NEGATIVE
COLOR: YELLOW
GLUCOSE QUALITATIVE U: ABNORMAL
HYALINE CASTS: 2 /LPF
KETONES URINE: NEGATIVE
LEUKOCYTE ESTERASE URINE: NEGATIVE
MICROSCOPIC-UA: NORMAL
NITRITE URINE: NEGATIVE
PH URINE: 6
PROTEIN URINE: NORMAL
RED BLOOD CELLS URINE: 3 /HPF
SPECIFIC GRAVITY URINE: 1.03
SQUAMOUS EPITHELIAL CELLS: 17 /HPF
UROBILINOGEN URINE: NORMAL
WHITE BLOOD CELLS URINE: 2 /HPF

## 2022-10-14 ENCOUNTER — OFFICE (OUTPATIENT)
Dept: URBAN - METROPOLITAN AREA CLINIC 90 | Facility: CLINIC | Age: 52
Setting detail: OPHTHALMOLOGY
End: 2022-10-14
Payer: MEDICAID

## 2022-10-14 DIAGNOSIS — H43.393: ICD-10-CM

## 2022-10-14 DIAGNOSIS — H25.043: ICD-10-CM

## 2022-10-14 DIAGNOSIS — H35.40: ICD-10-CM

## 2022-10-14 DIAGNOSIS — H35.3131: ICD-10-CM

## 2022-10-14 PROCEDURE — 92004 COMPRE OPH EXAM NEW PT 1/>: CPT | Performed by: OPHTHALMOLOGY

## 2022-10-14 PROCEDURE — 92250 FUNDUS PHOTOGRAPHY W/I&R: CPT | Performed by: OPHTHALMOLOGY

## 2022-10-14 ASSESSMENT — REFRACTION_CURRENTRX
OD_AXIS: 038
OS_VPRISM_DIRECTION: SV
OS_SPHERE: -12.25
OS_CYLINDER: -1.25
OD_OVR_VA: 20/
OS_OVR_VA: 20/
OD_AXIS: 041
OD_SPHERE: -12.25
OD_CYLINDER: -2.25
OS_AXIS: 133
OS_OVR_VA: 20/
OD_OVR_VA: 20/
OD_CYLINDER: -2.50
OD_VPRISM_DIRECTION: SV
OD_VPRISM_DIRECTION: SV
OS_CYLINDER: -1.50
OD_SPHERE: -10.50
OS_VPRISM_DIRECTION: SV
OS_AXIS: 130
OS_SPHERE: -14.25

## 2022-10-14 ASSESSMENT — REFRACTION_MANIFEST
OD_SPHERE: -13.00
OS_CYLINDER: -1.75
OD_AXIS: 40
OS_CYLINDER: -1.50
OS_AXIS: 145
OS_ADD: +2.50
OS_ADD: +2.50
OS_VA2: 20/25(J1)
OD_CYLINDER: -2.50
OD_VA2: 20/25(J1)
OD_VA1: 20/25-1
OS_AXIS: 145
OS_VA2: 20/25(J1)
OD_VA1: 20/25-1
OS_VA1: 20/25
OS_SPHERE: -14.00
OS_VA1: 20/25
OD_ADD: +2.50
OD_CYLINDER: -2.50
OD_SPHERE: -13.75
OD_ADD: +2.50
OS_SPHERE: -14.25
OD_VA2: 20/25(J1)
OD_AXIS: 40

## 2022-10-14 ASSESSMENT — KERATOMETRY
OS_K1POWER_DIOPTERS: 44.25
OD_K1POWER_DIOPTERS: 43.75
OS_K2POWER_DIOPTERS: 44.75
METHOD_AUTO_MANUAL: AUTO
OD_AXISANGLE_DEGREES: 118
OD_K2POWER_DIOPTERS: 45.25
OS_AXISANGLE_DEGREES: 063

## 2022-10-14 ASSESSMENT — SPHEQUIV_DERIVED
OS_SPHEQUIV: -14.75
OD_SPHEQUIV: -15
OS_SPHEQUIV: -16.25
OS_SPHEQUIV: -15.125
OD_SPHEQUIV: -16
OD_SPHEQUIV: -14.25

## 2022-10-14 ASSESSMENT — AXIALLENGTH_DERIVED
OS_AL: 31.57
OS_AL: 30.81
OS_AL: 30.56
OD_AL: 30.72
OD_AL: 31.3998
OD_AL: 30.24

## 2022-10-14 ASSESSMENT — VISUAL ACUITY
OS_BCVA: 20/30-1
OD_BCVA: 20/30+2

## 2022-10-14 ASSESSMENT — TONOMETRY
OS_IOP_MMHG: 16
OD_IOP_MMHG: 16

## 2022-10-14 ASSESSMENT — REFRACTION_AUTOREFRACTION
OS_SPHERE: -15.25
OS_AXIS: 143
OD_SPHERE: -14.75
OD_AXIS: 035
OD_CYLINDER: -2.50
OS_CYLINDER: -2.00

## 2022-10-14 ASSESSMENT — CONFRONTATIONAL VISUAL FIELD TEST (CVF)
OD_FINDINGS: FULL
OS_FINDINGS: FULL

## 2022-10-28 ENCOUNTER — OUTPATIENT (OUTPATIENT)
Dept: OUTPATIENT SERVICES | Facility: HOSPITAL | Age: 52
LOS: 1 days | End: 2022-10-28
Payer: MEDICAID

## 2022-10-28 ENCOUNTER — APPOINTMENT (OUTPATIENT)
Dept: ULTRASOUND IMAGING | Facility: IMAGING CENTER | Age: 52
End: 2022-10-28

## 2022-10-28 DIAGNOSIS — Z00.8 ENCOUNTER FOR OTHER GENERAL EXAMINATION: ICD-10-CM

## 2022-10-28 DIAGNOSIS — Z90.710 ACQUIRED ABSENCE OF BOTH CERVIX AND UTERUS: Chronic | ICD-10-CM

## 2022-10-28 DIAGNOSIS — N81.11 CYSTOCELE, MIDLINE: ICD-10-CM

## 2022-10-28 PROCEDURE — 76775 US EXAM ABDO BACK WALL LIM: CPT

## 2022-10-28 PROCEDURE — 76775 US EXAM ABDO BACK WALL LIM: CPT | Mod: 26

## 2022-11-11 ENCOUNTER — APPOINTMENT (OUTPATIENT)
Dept: UROLOGY | Facility: CLINIC | Age: 52
End: 2022-11-11
Payer: MEDICAID

## 2022-11-11 ENCOUNTER — OUTPATIENT (OUTPATIENT)
Dept: OUTPATIENT SERVICES | Facility: HOSPITAL | Age: 52
LOS: 1 days | End: 2022-11-11
Payer: MEDICAID

## 2022-11-11 VITALS
OXYGEN SATURATION: 100 % | SYSTOLIC BLOOD PRESSURE: 138 MMHG | TEMPERATURE: 97 F | DIASTOLIC BLOOD PRESSURE: 90 MMHG | HEART RATE: 100 BPM

## 2022-11-11 DIAGNOSIS — R39.15 URGENCY OF URINATION: ICD-10-CM

## 2022-11-11 DIAGNOSIS — R35.0 FREQUENCY OF MICTURITION: ICD-10-CM

## 2022-11-11 DIAGNOSIS — E11.9 TYPE 2 DIABETES MELLITUS W/OUT COMPLICATIONS: ICD-10-CM

## 2022-11-11 DIAGNOSIS — Z90.710 ACQUIRED ABSENCE OF BOTH CERVIX AND UTERUS: Chronic | ICD-10-CM

## 2022-11-11 PROCEDURE — 51784 ANAL/URINARY MUSCLE STUDY: CPT | Mod: 26

## 2022-11-11 PROCEDURE — 51797 INTRAABDOMINAL PRESSURE TEST: CPT | Mod: 26

## 2022-11-11 PROCEDURE — 51741 ELECTRO-UROFLOWMETRY FIRST: CPT | Mod: 26

## 2022-11-11 PROCEDURE — 51728 CYSTOMETROGRAM W/VP: CPT

## 2022-11-11 PROCEDURE — 51784 ANAL/URINARY MUSCLE STUDY: CPT

## 2022-11-11 PROCEDURE — 51728 CYSTOMETROGRAM W/VP: CPT | Mod: 26

## 2022-11-11 PROCEDURE — 51797 INTRAABDOMINAL PRESSURE TEST: CPT

## 2022-11-11 PROCEDURE — 51741 ELECTRO-UROFLOWMETRY FIRST: CPT

## 2022-11-15 DIAGNOSIS — E11.9 TYPE 2 DIABETES MELLITUS WITHOUT COMPLICATIONS: ICD-10-CM

## 2022-11-15 DIAGNOSIS — R39.15 URGENCY OF URINATION: ICD-10-CM

## 2022-12-23 ENCOUNTER — NON-APPOINTMENT (OUTPATIENT)
Age: 52
End: 2022-12-23

## 2023-03-03 NOTE — ED PROVIDER NOTE - PSH
Head, normocephalic, atraumatic, Face, Face within normal limits, Ears, External ears within normal limits, Nose/Nasopharynx, External nose normal appearance, nares patent, no nasal discharge, Mouth and Throat, Oral cavity appearance normal, Lips, Appearance normal
H/O: hysterectomy

## 2023-05-12 ENCOUNTER — OFFICE (OUTPATIENT)
Dept: URBAN - METROPOLITAN AREA CLINIC 90 | Facility: CLINIC | Age: 53
Setting detail: OPHTHALMOLOGY
End: 2023-05-12
Payer: MEDICAID

## 2023-05-12 DIAGNOSIS — H25.13: ICD-10-CM

## 2023-05-12 DIAGNOSIS — H43.393: ICD-10-CM

## 2023-05-12 DIAGNOSIS — H25.043: ICD-10-CM

## 2023-05-12 DIAGNOSIS — H35.40: ICD-10-CM

## 2023-05-12 DIAGNOSIS — H44.23: ICD-10-CM

## 2023-05-12 PROCEDURE — 92134 CPTRZ OPH DX IMG PST SGM RTA: CPT | Performed by: OPHTHALMOLOGY

## 2023-05-12 PROCEDURE — 92014 COMPRE OPH EXAM EST PT 1/>: CPT | Performed by: OPHTHALMOLOGY

## 2023-05-12 ASSESSMENT — TONOMETRY
OD_IOP_MMHG: 14
OS_IOP_MMHG: 14

## 2023-05-12 ASSESSMENT — REFRACTION_AUTOREFRACTION
OS_CYLINDER: -1.75
OD_CYLINDER: -2.50
OD_AXIS: 037
OS_SPHERE: -15.00
OD_SPHERE: -14.25
OS_AXIS: 138

## 2023-05-12 ASSESSMENT — REFRACTION_CURRENTRX
OD_AXIS: 043
OD_SPHERE: -10.50
OS_CYLINDER: -1.50
OS_VPRISM_DIRECTION: SV
OS_OVR_VA: 20/
OD_AXIS: 038
OS_OVR_VA: 20/
OD_VPRISM_DIRECTION: SV
OS_SPHERE: -13.75
OD_OVR_VA: 20/
OS_SPHERE: -12.25
OS_VPRISM_DIRECTION: SV
OS_CYLINDER: -1.25
OD_CYLINDER: -2.25
OD_OVR_VA: 20/
OD_SPHERE: -12.50
OD_CYLINDER: -2.25
OS_AXIS: 130
OD_VPRISM_DIRECTION: SV
OD_OVR_VA: 20/
OS_AXIS: 127
OS_OVR_VA: 20/

## 2023-05-12 ASSESSMENT — KERATOMETRY
OS_K2POWER_DIOPTERS: 45.00
OD_AXISANGLE_DEGREES: 118
OS_K1POWER_DIOPTERS: 44.25
OD_K1POWER_DIOPTERS: 43.75
OS_AXISANGLE_DEGREES: 068
METHOD_AUTO_MANUAL: AUTO
OD_K2POWER_DIOPTERS: 45.50

## 2023-05-12 ASSESSMENT — REFRACTION_MANIFEST
OD_VA1: 20/25-1
OS_AXIS: 145
OS_VA1: 20/25
OS_CYLINDER: -1.75
OD_ADD: +2.50
OS_VA1: 20/25
OD_SPHERE: -13.00
OD_AXIS: 40
OS_ADD: +2.50
OS_SPHERE: -14.00
OS_SPHERE: -14.25
OD_SPHERE: -13.75
OS_ADD: +2.50
OD_VA1: 20/25-1
OD_VA2: 20/25(J1)
OS_CYLINDER: -1.50
OD_CYLINDER: -2.50
OD_AXIS: 40
OD_CYLINDER: -2.50
OS_VA2: 20/25(J1)
OS_VA2: 20/25(J1)
OD_VA2: 20/25(J1)
OS_AXIS: 145
OD_ADD: +2.50

## 2023-05-12 ASSESSMENT — SPHEQUIV_DERIVED
OD_SPHEQUIV: -14.25
OD_SPHEQUIV: -15.5
OS_SPHEQUIV: -14.75
OS_SPHEQUIV: -15.125
OD_SPHEQUIV: -15
OS_SPHEQUIV: -15.875

## 2023-05-12 ASSESSMENT — AXIALLENGTH_DERIVED
OD_AL: 30.98
OD_AL: 30.16
OS_AL: 30.48
OS_AL: 30.73
OD_AL: 30.65
OS_AL: 31.23

## 2023-05-12 ASSESSMENT — CONFRONTATIONAL VISUAL FIELD TEST (CVF)
OD_FINDINGS: FULL
OS_FINDINGS: FULL

## 2023-05-12 ASSESSMENT — LID EXAM ASSESSMENTS
OD_COMMENTS: TELANGIECTATIC LID MARGINS
OS_COMMENTS: TELANGIECTATIC LID MARGINS

## 2023-05-12 ASSESSMENT — VISUAL ACUITY
OS_BCVA: 20/20
OD_BCVA: 20/20-1

## 2023-07-11 ENCOUNTER — NON-APPOINTMENT (OUTPATIENT)
Age: 53
End: 2023-07-11

## 2023-12-11 NOTE — ED PROVIDER NOTE - CONSTITUTIONAL, MLM
----- Message from [facility] sent at 12/11/2023  9:17 AM EST -----  Subject: Refill Request    QUESTIONS  Name of Medication? atorvastatin (LIPITOR) 40 MG tablet  Patient-reported dosage and instructions? Take one a day 40 MG  How many days do you have left? 0  Preferred Pharmacy? [id number]  Pharmacy phone number (if available)? [phone]  Additional Information for Provider? Patient would like his medication   refilled until he is able to come in to his appointment on 2/15.   ---------------------------------------------------------------------------  ------------    QUESTIONS  Name of Medication? omeprazole (PRILOSEC) 40 MG delayed release capsule  Patient-reported dosage and instructions? once a day 40 MG  How many days do you have left? 0  Preferred Pharmacy? [id number]  Pharmacy phone number (if available)? [phone]  Additional Information for Provider? Patient would like his medication  refilled until he is able to come in to his appointment on 2/15.  ---------------------------------------------------------------------------  --------------  CALL BACK INFO  What is the best way for the office to contact you? OK to leave message on  voicemail  Preferred Call Back Phone Number? [phone]  ---------------------------------------------------------------------------  --------------  SCRIPT ANSWERS  Relationship to Patient?  Self normal... Well appearing, well nourished, awake, alert, oriented to person, place, time/situation and in no apparent distress.

## 2024-03-22 ENCOUNTER — OFFICE (OUTPATIENT)
Dept: URBAN - METROPOLITAN AREA CLINIC 90 | Facility: CLINIC | Age: 54
Setting detail: OPHTHALMOLOGY
End: 2024-03-22
Payer: MEDICAID

## 2024-03-22 DIAGNOSIS — H44.23: ICD-10-CM

## 2024-03-22 DIAGNOSIS — H35.40: ICD-10-CM

## 2024-03-22 DIAGNOSIS — H25.13: ICD-10-CM

## 2024-03-22 DIAGNOSIS — H43.393: ICD-10-CM

## 2024-03-22 PROBLEM — Z83.518 FAMILY HISTORY OF OTHER SPECIFIED EYE DISORDER: Status: ACTIVE | Noted: 2024-03-22

## 2024-03-22 PROCEDURE — 92014 COMPRE OPH EXAM EST PT 1/>: CPT | Performed by: OPHTHALMOLOGY

## 2024-03-22 PROCEDURE — 92250 FUNDUS PHOTOGRAPHY W/I&R: CPT | Performed by: OPHTHALMOLOGY

## 2024-03-22 ASSESSMENT — REFRACTION_MANIFEST
OD_VA2: 20/25(J1)
OD_ADD: +2.50
OD_ADD: +2.50
OS_CYLINDER: -1.50
OS_AXIS: 145
OS_VA1: 20/25
OD_CYLINDER: -2.50
OD_AXIS: 40
OS_ADD: +2.50
OD_VA1: 20/25-1
OD_VA2: 20/25(J1)
OD_SPHERE: -13.75
OS_VA1: 20/25
OD_CYLINDER: -2.50
OS_AXIS: 145
OD_SPHERE: -13.00
OS_VA2: 20/25(J1)
OS_CYLINDER: -1.75
OS_SPHERE: -14.25
OD_VA1: 20/25-1
OS_ADD: +2.50
OD_AXIS: 40
OS_VA2: 20/25(J1)
OS_SPHERE: -14.00

## 2024-03-22 ASSESSMENT — REFRACTION_CURRENTRX
OS_AXIS: 127
OD_CYLINDER: -2.25
OD_VPRISM_DIRECTION: SV
OD_SPHERE: -12.50
OD_OVR_VA: 20/
OS_AXIS: 130
OS_SPHERE: -12.25
OD_SPHERE: -10.50
OD_VPRISM_DIRECTION: SV
OS_OVR_VA: 20/
OD_AXIS: 043
OS_CYLINDER: -1.25
OD_VPRISM_DIRECTION: SV
OD_SPHERE: -12.50
OD_CYLINDER: -2.25
OS_SPHERE: -13.75
OD_OVR_VA: 20/
OS_OVR_VA: 20/
OS_CYLINDER: -1.00
OS_OVR_VA: 20/
OS_VPRISM_DIRECTION: SV
OS_VPRISM_DIRECTION: SV
OD_AXIS: 038
OS_AXIS: 117
OS_VPRISM_DIRECTION: SV
OD_AXIS: 037
OS_CYLINDER: -1.50
OS_SPHERE: -14.50
OD_OVR_VA: 20/
OD_CYLINDER: -2.50

## 2024-03-22 ASSESSMENT — LID EXAM ASSESSMENTS
OD_COMMENTS: TELANGIECTATIC LID MARGINS
OS_COMMENTS: TELANGIECTATIC LID MARGINS

## 2024-03-22 ASSESSMENT — SPHEQUIV_DERIVED
OS_SPHEQUIV: -14.75
OD_SPHEQUIV: -14.25
OS_SPHEQUIV: -15.125
OD_SPHEQUIV: -15

## 2024-09-13 ENCOUNTER — APPOINTMENT (OUTPATIENT)
Dept: ULTRASOUND IMAGING | Facility: CLINIC | Age: 54
End: 2024-09-13
Payer: MEDICAID

## 2024-09-13 ENCOUNTER — OUTPATIENT (OUTPATIENT)
Dept: OUTPATIENT SERVICES | Facility: HOSPITAL | Age: 54
LOS: 1 days | End: 2024-09-13
Payer: MEDICAID

## 2024-09-13 DIAGNOSIS — Z00.8 ENCOUNTER FOR OTHER GENERAL EXAMINATION: ICD-10-CM

## 2024-09-13 DIAGNOSIS — Z90.710 ACQUIRED ABSENCE OF BOTH CERVIX AND UTERUS: Chronic | ICD-10-CM

## 2024-09-13 PROCEDURE — 76882 US LMTD JT/FCL EVL NVASC XTR: CPT

## 2024-09-13 PROCEDURE — 76882 US LMTD JT/FCL EVL NVASC XTR: CPT | Mod: 26,LT

## 2024-11-17 ENCOUNTER — NON-APPOINTMENT (OUTPATIENT)
Age: 54
End: 2024-11-17

## 2025-03-21 ENCOUNTER — OFFICE (OUTPATIENT)
Facility: LOCATION | Age: 55
Setting detail: OPHTHALMOLOGY
End: 2025-03-21
Payer: COMMERCIAL

## 2025-03-21 DIAGNOSIS — H25.13: ICD-10-CM

## 2025-03-21 DIAGNOSIS — H01.001: ICD-10-CM

## 2025-03-21 DIAGNOSIS — H44.23: ICD-10-CM

## 2025-03-21 DIAGNOSIS — H43.393: ICD-10-CM

## 2025-03-21 DIAGNOSIS — H52.13: ICD-10-CM

## 2025-03-21 PROBLEM — H52.7 REFRACTIVE ERROR: Status: ACTIVE | Noted: 2025-03-21

## 2025-03-21 PROBLEM — H01.004 BLEPHARITIS; RIGHT UPPER LID, LEFT UPPER LID: Status: ACTIVE | Noted: 2025-03-21

## 2025-03-21 PROCEDURE — 92134 CPTRZ OPH DX IMG PST SGM RTA: CPT | Performed by: OPHTHALMOLOGY

## 2025-03-21 PROCEDURE — 92014 COMPRE OPH EXAM EST PT 1/>: CPT | Performed by: OPHTHALMOLOGY

## 2025-03-21 PROCEDURE — 92015 DETERMINE REFRACTIVE STATE: CPT | Performed by: OPHTHALMOLOGY

## 2025-03-21 ASSESSMENT — REFRACTION_MANIFEST
OD_SPHERE: -14.75
OD_SPHERE: -13.00
OD_CYLINDER: -2.50
OD_VA1: 20/25
OD_CYLINDER: -2.50
OS_VA1: 20/25
OS_CYLINDER: -1.50
OD_CYLINDER: -2.75
OD_SPHERE: -13.75
OD_AXIS: 40
OD_VA2: 20/25(J1)
OS_SPHERE: -14.25
OS_VA1: 20/25
OS_SPHERE: -14.00
OS_ADD: +2.50
OD_VA1: 20/25-1
OS_VA1: 20/25-
OD_ADD: +2.50
OD_AXIS: 40
OD_ADD: +2.50
OS_AXIS: 145
OS_VA2: 20/25(J1)
OS_ADD: +2.50
OD_VA1: 20/25-1
OS_CYLINDER: -1.50
OU_VA: 20/25
OS_AXIS: 145
OD_AXIS: 035
OS_AXIS: 142
OS_CYLINDER: -1.75
OS_SPHERE: -15.50
OD_VA2: 20/25(J1)
OS_VA2: 20/25(J1)

## 2025-03-21 ASSESSMENT — REFRACTION_CURRENTRX
OD_SPHERE: -12.50
OD_OVR_VA: 20/
OS_VPRISM_DIRECTION: SV
OD_VPRISM_DIRECTION: SV
OS_CYLINDER: -1.00
OS_OVR_VA: 20/
OD_CYLINDER: -2.50
OS_VPRISM_DIRECTION: SV
OS_CYLINDER: -1.25
OS_SPHERE: -14.25
OS_OVR_VA: 20/
OD_AXIS: 043
OD_AXIS: 038
OD_SPHERE: -12.25
OD_VPRISM_DIRECTION: SV
OS_SPHERE: -13.75
OS_AXIS: 127
OS_OVR_VA: 20/
OD_AXIS: 037
OS_VPRISM_DIRECTION: SV
OD_SPHERE: -10.50
OD_AXIS: 040
OD_CYLINDER: -2.50
OS_AXIS: 117
OS_AXIS: 130
OS_SPHERE: -14.50
OS_AXIS: 127
OS_CYLINDER: -1.25
OD_CYLINDER: -2.25
OD_OVR_VA: 20/
OD_CYLINDER: -2.25
OD_VPRISM_DIRECTION: SV
OS_SPHERE: -12.25
OD_OVR_VA: 20/
OD_SPHERE: -12.50
OS_CYLINDER: -1.50

## 2025-03-21 ASSESSMENT — LID EXAM ASSESSMENTS
OD_BLEPHARITIS: RUL 1+
OS_COMMENTS: TELANGIECTATIC LID MARGINS
OS_BLEPHARITIS: LUL 1+
OD_COMMENTS: TELANGIECTATIC LID MARGINS

## 2025-03-21 ASSESSMENT — TONOMETRY
OD_IOP_MMHG: 16
OS_IOP_MMHG: 17

## 2025-03-21 ASSESSMENT — KERATOMETRY
METHOD_AUTO_MANUAL: AUTO
OD_AXISANGLE_DEGREES: 118
OD_K1POWER_DIOPTERS: 43.75
OD_K2POWER_DIOPTERS: 45.25
OS_K2POWER_DIOPTERS: 45.00
OS_AXISANGLE_DEGREES: 060
OS_K1POWER_DIOPTERS: 44.25

## 2025-03-21 ASSESSMENT — REFRACTION_AUTOREFRACTION
OD_AXIS: 036
OD_CYLINDER: -2.75
OS_AXIS: 142
OD_SPHERE: -14.75
OS_SPHERE: -15.75
OS_CYLINDER: -2.00

## 2025-03-21 ASSESSMENT — VISUAL ACUITY
OS_BCVA: 20/150
OD_BCVA: 20/30+2

## 2025-03-21 ASSESSMENT — CONFRONTATIONAL VISUAL FIELD TEST (CVF)
OS_FINDINGS: FULL
OD_FINDINGS: FULL

## 2025-04-30 ENCOUNTER — OFFICE (OUTPATIENT)
Facility: LOCATION | Age: 55
Setting detail: OPHTHALMOLOGY
End: 2025-04-30
Payer: COMMERCIAL

## 2025-04-30 DIAGNOSIS — H00.11: ICD-10-CM

## 2025-04-30 DIAGNOSIS — H01.001: ICD-10-CM

## 2025-04-30 DIAGNOSIS — H01.004: ICD-10-CM

## 2025-04-30 PROCEDURE — 99213 OFFICE O/P EST LOW 20 MIN: CPT | Performed by: OPHTHALMOLOGY

## 2025-04-30 ASSESSMENT — REFRACTION_MANIFEST
OD_CYLINDER: -2.50
OS_AXIS: 142
OS_ADD: +2.50
OS_CYLINDER: -1.50
OD_SPHERE: -13.75
OD_ADD: +2.50
OS_VA1: 20/25
OD_VA2: 20/25(J1)
OS_ADD: +2.50
OD_SPHERE: -13.00
OD_AXIS: 40
OD_VA1: 20/25
OS_CYLINDER: -1.75
OS_CYLINDER: -1.50
OD_VA2: 20/25(J1)
OD_CYLINDER: -2.75
OD_SPHERE: -14.75
OD_AXIS: 40
OD_CYLINDER: -2.50
OS_VA2: 20/25(J1)
OS_SPHERE: -14.00
OD_VA1: 20/25-1
OS_VA1: 20/25-
OU_VA: 20/25
OS_SPHERE: -15.50
OS_AXIS: 145
OD_AXIS: 035
OS_VA1: 20/25
OS_SPHERE: -14.25
OS_VA2: 20/25(J1)
OD_VA1: 20/25-1
OS_AXIS: 145
OD_ADD: +2.50

## 2025-04-30 ASSESSMENT — REFRACTION_CURRENTRX
OD_OVR_VA: 20/
OS_VPRISM_DIRECTION: SV
OD_SPHERE: -12.50
OD_AXIS: 037
OD_VPRISM_DIRECTION: SV
OS_OVR_VA: 20/
OD_SPHERE: -12.50
OD_SPHERE: -10.50
OS_CYLINDER: -1.25
OS_SPHERE: -12.25
OD_OVR_VA: 20/
OS_OVR_VA: 20/
OD_CYLINDER: -2.25
OS_CYLINDER: -1.50
OS_CYLINDER: -1.00
OD_AXIS: 043
OS_AXIS: 127
OD_VPRISM_DIRECTION: SV
OS_SPHERE: -14.50
OD_CYLINDER: -2.50
OD_SPHERE: -12.25
OD_OVR_VA: 20/
OS_SPHERE: -13.75
OS_CYLINDER: -1.25
OS_SPHERE: -14.25
OS_AXIS: 130
OD_CYLINDER: -2.50
OD_AXIS: 038
OD_CYLINDER: -2.25
OS_AXIS: 117
OS_VPRISM_DIRECTION: SV
OD_AXIS: 040
OS_VPRISM_DIRECTION: SV
OD_VPRISM_DIRECTION: SV
OS_AXIS: 127
OS_OVR_VA: 20/

## 2025-04-30 ASSESSMENT — VISUAL ACUITY
OS_BCVA: 20/150
OD_BCVA: 20/30+2

## 2025-04-30 ASSESSMENT — TONOMETRY
OS_IOP_MMHG: 18
OD_IOP_MMHG: 18

## 2025-04-30 ASSESSMENT — CONFRONTATIONAL VISUAL FIELD TEST (CVF)
OS_FINDINGS: FULL
OD_FINDINGS: FULL

## 2025-08-26 ENCOUNTER — OFFICE (OUTPATIENT)
Facility: LOCATION | Age: 55
Setting detail: OPHTHALMOLOGY
End: 2025-08-26
Payer: COMMERCIAL

## 2025-08-26 DIAGNOSIS — H01.005: ICD-10-CM

## 2025-08-26 DIAGNOSIS — H01.001: ICD-10-CM

## 2025-08-26 DIAGNOSIS — H01.004: ICD-10-CM

## 2025-08-26 DIAGNOSIS — H00.14: ICD-10-CM

## 2025-08-26 DIAGNOSIS — B88.0: ICD-10-CM

## 2025-08-26 DIAGNOSIS — H01.002: ICD-10-CM

## 2025-08-26 PROCEDURE — 99213 OFFICE O/P EST LOW 20 MIN: CPT | Performed by: OPHTHALMOLOGY

## 2025-08-26 ASSESSMENT — REFRACTION_CURRENTRX
OS_VPRISM_DIRECTION: SV
OD_SPHERE: -12.50
OD_CYLINDER: -2.50
OS_OVR_VA: 20/
OD_VPRISM_DIRECTION: SV
OS_CYLINDER: -1.00
OD_VPRISM_DIRECTION: SV
OD_OVR_VA: 20/
OS_CYLINDER: -1.25
OD_CYLINDER: -2.25
OD_AXIS: 040
OD_OVR_VA: 20/
OS_SPHERE: -13.75
OS_CYLINDER: -1.50
OS_SPHERE: -14.25
OS_AXIS: 117
OD_SPHERE: -12.50
OS_VPRISM_DIRECTION: SV
OD_CYLINDER: -2.50
OS_OVR_VA: 20/
OD_AXIS: 043
OD_AXIS: 038
OS_AXIS: 127
OD_SPHERE: -12.25
OD_SPHERE: -10.50
OD_OVR_VA: 20/
OD_AXIS: 037
OS_CYLINDER: -1.25
OD_CYLINDER: -2.25
OS_AXIS: 130
OS_SPHERE: -14.50
OS_AXIS: 127
OD_VPRISM_DIRECTION: SV
OS_SPHERE: -12.25
OS_VPRISM_DIRECTION: SV
OS_OVR_VA: 20/

## 2025-08-26 ASSESSMENT — REFRACTION_MANIFEST
OS_CYLINDER: -1.50
OS_VA2: 20/25(J1)
OS_SPHERE: -14.25
OS_VA1: 20/25
OD_SPHERE: -13.75
OS_ADD: +2.50
OS_AXIS: 145
OS_CYLINDER: -1.75
OS_SPHERE: -15.50
OD_CYLINDER: -2.75
OD_CYLINDER: -2.50
OD_VA2: 20/25(J1)
OD_VA1: 20/25
OS_AXIS: 145
OD_SPHERE: -14.75
OS_AXIS: 142
OD_CYLINDER: -2.50
OD_SPHERE: -13.00
OS_VA2: 20/25(J1)
OD_VA1: 20/25-1
OD_AXIS: 40
OU_VA: 20/25
OS_CYLINDER: -1.50
OD_VA2: 20/25(J1)
OS_SPHERE: -14.00
OD_AXIS: 035
OD_ADD: +2.50
OS_VA1: 20/25-
OD_VA1: 20/25-1
OS_VA1: 20/25
OD_AXIS: 40
OD_ADD: +2.50
OS_ADD: +2.50

## 2025-08-26 ASSESSMENT — LID EXAM ASSESSMENTS
OS_COMMENTS: TELANGIECTATIC LID MARGINS
OS_BLEPHARITIS: LLL LUL 1+
OD_COMMENTS: TELANGIECTATIC LID MARGINS
OD_BLEPHARITIS: RLL RUL 1+

## 2025-08-26 ASSESSMENT — VISUAL ACUITY
OS_BCVA: 20/40
OD_BCVA: 20/25+

## 2025-08-26 ASSESSMENT — KERATOMETRY
OS_K2POWER_DIOPTERS: 45.00
OD_AXISANGLE_DEGREES: 120
OS_K1POWER_DIOPTERS: 44.25
METHOD_AUTO_MANUAL: AUTO
OS_AXISANGLE_DEGREES: 059
OD_K1POWER_DIOPTERS: 43.75
OD_K2POWER_DIOPTERS: 45.25

## 2025-08-26 ASSESSMENT — CONFRONTATIONAL VISUAL FIELD TEST (CVF)
OD_FINDINGS: FULL
OS_FINDINGS: FULL

## 2025-08-26 ASSESSMENT — REFRACTION_AUTOREFRACTION
OS_AXIS: 142
OD_SPHERE: -15.25
OS_SPHERE: -16.25
OD_CYLINDER: -2.25
OS_CYLINDER: -1.50
OD_AXIS: 038